# Patient Record
Sex: MALE | Race: WHITE | Employment: OTHER | URBAN - METROPOLITAN AREA
[De-identification: names, ages, dates, MRNs, and addresses within clinical notes are randomized per-mention and may not be internally consistent; named-entity substitution may affect disease eponyms.]

---

## 2017-01-20 ENCOUNTER — OFFICE VISIT (OUTPATIENT)
Dept: VASCULAR SURGERY | Age: 77
End: 2017-01-20

## 2017-01-20 DIAGNOSIS — I65.23: ICD-10-CM

## 2017-01-20 DIAGNOSIS — I70.213 ATHEROSCLEROSIS OF NATIVE ARTERY OF BOTH LOWER EXTREMITIES WITH INTERMITTENT CLAUDICATION (HCC): ICD-10-CM

## 2017-01-20 DIAGNOSIS — I74.09 AORTOILIAC OCCLUSIVE DISEASE (HCC): ICD-10-CM

## 2017-01-20 DIAGNOSIS — I77.9 BILATERAL CAROTID ARTERY DISEASE (HCC): ICD-10-CM

## 2017-01-20 NOTE — PROCEDURES
[facility]   *** FINAL REPORT ***    Name: Aruna Blake  MRN: [id number]       Outpatient  : 20 Dec 1940  HIS Order #: [id number]  [id number] [facility] Visit #: [id number]  Date: 2017    TYPE OF TEST: Peripheral Arterial Testing    REASON FOR TEST  Peripheral vascular dz NOS    Right Leg  Segmentals: Normal                     mmHg  Brachial         148  High thigh  Low thigh  Calf             158  Posterior tibial 160  Dorsalis pedis   146  Peroneal  Metatarsal  Toe pressure      67  Doppler:    Normal  Ankle/Brachial: 1.08    Left Leg  Segmentals: Normal                     mmHg  Brachial         144  High thigh  Low thigh  Calf             137  Posterior tibial 141  Dorsalis pedis   149  Peroneal  Metatarsal  Toe pressure     107  Doppler:    Normal  Ankle/Brachial: 1.01  Post exercise results:  Speed: 1.5  mph  Grade: 12  %  Duration: 5.0 MINS     Brachial  Right Ankle  BANDAR    Left Ankle  BANDAR    1:   159         109     0.69       102     0.64  2:   155         133     0.86       121     0.78  3:  4:  5:    INTERPRETATION/FINDINGS  Physiologic testing was performed using continuous wave doppler and  segmental pressures. 1. No evidence of significant peripheral arterial disease at rest in  the right leg. 2. No evidence of significant peripheral arterial disease at rest in  the left leg. 3. The right ankle/brachial index is 1.08 and the left ankle/brachial  index is 1.01.  4. The DBI on the right is 0.45 and on the left is 0.72.  5. Completed  treadmill exercise showed biphasic signals on the left  and biphasic signals wiht bruit on the right suggesting arterial  inflow disease. Significant drop in the ankle brachial index  bilaterally with exercise, from 1.08 to 0.69 in the right leg and from   1.01 to 0.64 in the left leg, consistent with bilateral significant  arterial occlusive disease. No significant changes at rest as compared with previous study.     ADDITIONAL COMMENTS    I have personally reviewed the data relevant to the interpretation of  this  study. TECHNOLOGIST: Sharon Barron RVT, BS  Signed: 01/20/2017 10:00 AM    PHYSICIAN: DANIEL Frederick   Signed: 01/20/2017 01:46 PM

## 2017-01-20 NOTE — PROCEDURES
[facility]   *** FINAL REPORT ***    Name: Teresa Still  MRN: [id number]       Outpatient  : 20 Dec 1940  HIS Order #: [id number]  [id number] [facility] Visit #: [id number]  Date: 2017    TYPE OF TEST: Cerebrovascular Duplex    REASON FOR TEST  Carotid disease, carotid stent    Right Carotid:-             Proximal               Mid                 Distal  cm/s  Systolic  Diastolic  Systolic  Diastolic  Systolic  Diastolic  CCA:     87.6      18.0                            69.0      19.0  Bulb:    48.0       8.0  ECA:     65.0       9.0  ICA:     84.0      29.0       86.0      30.0       90.0      29.0  ICA/CCA:  1.0       1.6    ICA Stenosis: Normal    Right Vertebral:-  Finding: Antegrade  Sys:       54.0  Haydee:       18.0    Right Subclavian:    Left Carotid:-            Proximal                Mid                 Distal  cm/s  Systolic  Diastolic  Systolic  Diastolic  Systolic  Diastolic  CCA:     01.3      12.0                            23.0       8.0  Bulb:    36.0      10.0  ECA:     28.0       7.0  ICA:     59.0      17.0       71.0      22.0       78.0      25.0  ICA/CCA:  1.8       2.1    ICA Stenosis: Normal    Left Vertebral:-  Finding: Not visualized  Sys:  Haydee:    Left Subclavian:    INTERPRETATION/FINDINGS  Duplex images were obtained using 2-D gray scale, color flow and  spectral doppler analysis. 1. Patent right CEA without significant restenosis. 2. Patent left common carotid/ICA stent without evidence of  significant stenosis. 2. No significant stenosis in the external carotid arteries  bilaterally. 3. Antegrade flow in the right vertebral artery. Unable to detect  Doppler signals in the left vertebral artery. No significant changes when compared to previous study. ADDITIONAL COMMENTS    I have personally reviewed the data relevant to the interpretation of  this  study. TECHNOLOGIST: Sintia Barron RVT, BS  Signed: 2017 10:18 AM    PHYSICIAN: Charisse Salguero D.O.   Signed: 01/20/2017 01:46 PM

## 2017-01-20 NOTE — PROCEDURES
[facility]   *** FINAL REPORT ***    Name: Xochilt Duenas  MRN: [id number]       Outpatient  : 20 Dec 1940  HIS Order #: [id number]  [id number] [facility] Visit #: [id number]  Date: 2017    TYPE OF TEST: Aorto-Iliac Duplex    REASON FOR TEST  Peripheral Arterial Disease    B-Mode:-                 (cm)   1     2     3  Aortic diameter:         AP:                 2.4                           TV:                 2.3  Common iliac diameter:   Right:                           Left:    Duplex:-                           PSV  Stenosis                           ----- --------------------  Aorta: (1)                     Normal         (2)         (3)                66.0    Right common iliac:  Right external iliac:    Left common iliac:  Left external iliac:    INTERPRETATION/FINDINGS  Duplex images were obtained using 2-D gray scale, color flow and  spectral doppler analysis. Iliac stents:  1. Bilateral common iliac arteries patent without significant  stenosis. Bilateral common iliac artery stents not visible. 2. The right common iliac artery measures approximately 1.6 x 1.6 cm  Trv and the left common iliac artery measures approximately 1.3 x 1.3  cm Trv. Complex plaquing noted in the infra-renal aorta involving the   proximal segments of the bilateral common iliac arteries. 3. Bilateral external iliac arteries stents patent without significant   stenosis. 4. Triphasic signals noted throughout. 5. ABIs suggest perfusion within normal limits bilaterally at rest.  The BANDAR on the right is 1.08 and on the left is 1.01. Essentially no significant changes as compared with previous study.     ADDITIONAL COMMENTS  Infra Ao: 66 cm/sec  RCIA:  Prx 102 cm/sec    Mid 113 cm/sec    Dst 115 cm/sec    RIIA Prx  142 cm/sec      REIA Stent:  Prx 179 cm/sec       Mid 166 cm/sec    Dst 168 cm/sec      REIA Dst 197 cm/sec  LCIA:  Prx 131 cm/sec     Mid 122 cm/sec     Dst 120 cm/sec     LIIA  Prx 97 cm/sec      APOLONIA Stent:  Prx 145 cm/sec      Mid 127 cm/sec     Dst 129 cm/sec       APOLONIA Dst 142 cm/sec    I have personally reviewed the data relevant to the interpretation of  this  study. TECHNOLOGIST: Rose Barron RVT, BS  Signed: 01/20/2017 11:42 AM    PHYSICIAN: DANIEL Frederick   Signed: 01/20/2017 01:46 PM

## 2017-02-01 ENCOUNTER — OFFICE VISIT (OUTPATIENT)
Dept: VASCULAR SURGERY | Age: 77
End: 2017-02-01

## 2017-02-01 VITALS
BODY MASS INDEX: 22.49 KG/M2 | WEIGHT: 135 LBS | HEIGHT: 65 IN | RESPIRATION RATE: 20 BRPM | SYSTOLIC BLOOD PRESSURE: 142 MMHG | HEART RATE: 76 BPM | DIASTOLIC BLOOD PRESSURE: 78 MMHG

## 2017-02-01 DIAGNOSIS — I77.9 BILATERAL CAROTID ARTERY DISEASE (HCC): Primary | ICD-10-CM

## 2017-02-01 DIAGNOSIS — I74.09 AORTOILIAC OCCLUSIVE DISEASE (HCC): ICD-10-CM

## 2017-02-01 DIAGNOSIS — Z87.891 HISTORY OF TOBACCO USE: ICD-10-CM

## 2017-02-01 DIAGNOSIS — I70.213 ATHEROSCLEROSIS OF NATIVE ARTERY OF BOTH LOWER EXTREMITIES WITH INTERMITTENT CLAUDICATION (HCC): ICD-10-CM

## 2017-02-01 RX ORDER — LOSARTAN POTASSIUM 50 MG/1
100 TABLET ORAL DAILY
COMMUNITY
End: 2021-09-01

## 2017-02-01 NOTE — PROGRESS NOTES
Flaquito Handley    Chief Complaint   Patient presents with    Leg Pain       History and Physical    Flaquito Handley is a 67 y.o. male with history of PAD who presents today in follow up. He had angio with balloon angioplasty and stent of right external iliac artery on 5/4 of last year. He has history of carotid stenosis with repair of the bilateral internal carotids in the past. He also has history of kissing stents and had rescue stenting of left external iliac artery in December of last year. He states that his pain has resolved. He does not describe any claudication at this time. He remains very active and states that he is able to go about his daily activities without any pain. No rest pain. He states he can walk as far as he wants if he walks slowly. Denies fever or chills. He quit smoking many years ago. He denies any strokelike symptoms. He does report that he has severe bruising with taking the Plavix and would like to stop taking the medication. He does take a daily aspirin as well. Past Medical History   Diagnosis Date    Adverse effect of anesthesia      slow to wake    Carotid artery disease ([address]      s/p left CEA 2006 with recurrent stenosis treated with stenting May 2011    Cataract     COPD (chronic obstructive pulmonary disease) ([address]      emphysema    DJD (degenerative joint disease)     Esophageal cancer (HCC)      Polyps removed    GERD (gastroesophageal reflux disease)     History of tobacco use     HTN (hypertension)     Hyperlipidemia     Normal nuclear stress test 05/27/2011     No ischemia or prior infarction. EF 64%. Neg EKG on max EST.   Ex time 5:15.   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-2011     Past Surgical History   Procedure Laterality Date    Hx carotid endarterectomy 2006     left    Hx heent       polyps removed from vocal cords    Hx refractive surgery      Hx other surgical       balloon and stent placed in leg    Hx other surgical       carotid stent placed     Patient Active Problem List   Diagnosis Code    Hyperlipidemia E78.5    GERD (gastroesophageal reflux disease) K21.9    Carotid artery disease (HCC) I77.9    HTN (hypertension) I10    DJD (degenerative joint disease) M19.90    History of tobacco use Z87.891    COPD (chronic obstructive pulmonary disease) ([facility] J44.9    PAD (peripheral artery disease) (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) I74.09     Current Outpatient Prescriptions   Medication Sig Dispense Refill    losartan (COZAAR) 50 mg tablet Take  by mouth daily.  clopidogrel (PLAVIX) 75 mg tablet Take 1 Tab by mouth daily. Indications: PERIPHERAL ARTERIAL THROMBOEMBOLISM PREVENTION 30 Tab 2    atorvastatin (LIPITOR) 80 mg tablet Take 1 tablet by mouth daily. 30 tablet 0    esomeprazole (NEXIUM) 40 mg capsule Take 40 mg by mouth daily.  tiotropium (SPIRIVA WITH HANDIHALER) 18 mcg inhalation capsule Take 1 Cap by inhalation daily. No Known Allergies    Physical Exam:    Visit Vitals    /78 (BP 1 Location: Left arm, BP Patient Position: Sitting)    Pulse 76    Resp 20    Ht 5' 5\" (1.651 m)    Wt 135 lb (61.2 kg)    BMI 22.47 kg/m2      General: Well-appearing male in no acute distress   HEENT: EOMI, no scleral icterus is noted. bilateral neck incisions are well-healed  Pulmonary: No increased work or breathing is noted. Abdomen: nondistended. Extremities: Warm and well perfused bilaterally. Pt has no bilateral lower extremity edema.    Neuro: Cranial nerves II through XII are grossly intact   Integument: No ulcerations are identified visibly    INTERPRETATION/FINDINGS  Duplex images were obtained using 2-D gray scale, color flow and  spectral doppler analysis. 1. Patent right CEA without significant restenosis. 2. Patent left common carotid/ICA stent without evidence of  significant stenosis. 2. No significant stenosis in the external carotid arteries  bilaterally. 3. Antegrade flow in the right vertebral artery. Unable to detect  Doppler signals in the left vertebral artery. No significant changes when compared to previous study. INTERPRETATION/FINDINGS  Physiologic testing was performed using continuous wave doppler and  segmental pressures. 1. No evidence of significant peripheral arterial disease at rest in  the right leg. 2. No evidence of significant peripheral arterial disease at rest in  the left leg. 3. The right ankle/brachial index is 1.08 and the left ankle/brachial  index is 1.01.  4. The DBI on the right is 0.45 and on the left is 0.72.  5. Completed treadmill exercise showed biphasic signals on the left  and biphasic signals wiht bruit on the right suggesting arterial  inflow disease. Significant drop in the ankle brachial index  bilaterally with exercise, from 1.08 to 0.69 in the right leg and from  1.01 to 0.64 in the left leg, consistent with bilateral significant  arterial occlusive disease. No significant changes at rest as compared with previous study. INTERPRETATION/FINDINGS  Duplex images were obtained using 2-D gray scale, color flow and  spectral doppler analysis. Iliac stents:  1. Bilateral common iliac arteries patent without significant  stenosis. Bilateral common iliac artery stents not visible. 2. The right common iliac artery measures approximately 1.6 x 1.6 cm  Trv and the left common iliac artery measures approximately 1.3 x 1.3  cm Trv. Complex plaquing noted in the infra-renal aorta involving the  proximal segments of the bilateral common iliac arteries. 3. Bilateral external iliac arteries stents patent without significant  stenosis.   4. Triphasic signals noted throughout. 5. ABIs suggest perfusion within normal limits bilaterally at rest.  The BANDAR on the right is 1.08 and on the left is 1.01. Essentially no significant changes as compared with previous study. Impression and Plan:  Kait Barton is a 67 y.o. male with PAD who presents today in f/u after vascular studies. He is s/p right leg angiogram with stenting of his external iliac artery. His imaging was reviewed with him today in the office. He has no significant arterial insufficiency at rest however he does have a significant reduction in his perfusion with exercise. Discussed that as he has no lifestyle limiting claudication would not recommend intervention at this time. No rest pain. Bilateral iliac stents are patent. His carotid study was also reviewed today in the office and reveals left carotid stent is patent as well as his right carotid endarterectomy with no significant stenosis appreciated. Patient is completely asymptomatic. Discussed that would recommend continuing Plavix if he can tolerate the medication however patient states he would like to stop it at this time. Discussed that he is far enough out from his stenting that he would be safe to stop the Plavix at this time and if his symptoms of claudication return or worsen he is to call the office and may benefit from restarting medication. He will continue his daily aspirin. Discussed we will repeat his studies in 1 year and he will f/u afterwards. He will f/u sooner as needed. Plan was discussed. Patient expresses understanding and agrees. Follow-up Disposition:  Return in about 1 year (around 2/1/2018). Rolena  Brittany  [phone]        PLEASE NOTE:  This document has been produced using voice recognition software. Unrecognized errors in transcription may be present.

## 2017-02-01 NOTE — MR AVS SNAPSHOT
Visit Information Date & Time Provider Department Dept. Phone Encounter #  
 2/1/2017 10:00 AM [address] [place] [address] Vein/Vascular [address] [id number] Follow-up Instructions Return in about 1 year (around 2/1/2018). Your Appointments 2/1/2018  8:00 AM  
PROCEDURE with [facility] NONIMAGING  
[facility] ([facility] SCHEDULING) Appt Note: amira 1 yr wild [address] [address]  
[phone] [address] [phone]  
  
    
 2/1/2018  9:00 AM  
PROCEDURE with [facility] IMAGING 2  
[facility] ([facility]SE [facility] SCHEDULING) Appt Note: iliac stent 1 yr wild [address] [address]  
[phone] [address] [address]  
  
    
 2/1/2018 10:00 AM  
PROCEDURE with [facility] IMAGING 2  
[facility] (JOSE [facility] SCHEDULING) Appt Note: cv 1 yr wild [address] [address]  
[phone]  
  
    
 2/12/2018 10:30 AM  
Follow Up with [address] [place]  
[facility] ([place]) Appt Note: 1 year fup with studis at the [address] [place] [address]  
[phone]  
  
   
 [address] [place] [id number] Upcoming Health Maintenance Date Due DTaP/Tdap/Td series (1 - Tdap) 12/20/1960 ZOSTER VACCINE AGE 60> 12/20/1999 GLAUCOMA SCREENING Q2Y 12/20/2004 Pneumococcal 65+ High/Highest Risk (1 of 2 - PCV13) 12/20/2004 MEDICARE YEARLY EXAM 12/20/2004 INFLUENZA AGE 9 TO ADULT 8/1/2016 Allergies as of 2/1/2017  Review Complete On: 2/1/2017 By: Corona Sanders LPN No Known Allergies Current Immunizations  Never Reviewed No immunizations on file. Not reviewed this visit You Were Diagnosed With   
  
 Codes Comments Bilateral carotid artery disease ([facility]    -  Primary ICD-10-CM: I77.9 ICD-9-CM: 447.9 Aortoiliac occlusive disease (HCC)     ICD-10-CM: I74.09 
ICD-9-CM: 444.09 Atherosclerosis of native artery of both lower extremities with intermittent claudication ([facility]     ICD-10-CM: W56.009 ICD-9-CM: 440.21 Vitals BP Pulse Resp Height(growth percentile) Weight(growth percentile) BMI  
 142/78 (BP 1 Location: Left arm, BP Patient Position: Sitting) 76 20 5' 5\" (1.651 m) 135 lb (61.2 kg) 22.47 kg/m2 Smoking Status Former Smoker Vitals History BMI and BSA Data Body Mass Index Body Surface Area  
 22.47 kg/m 2 1.68 m 2 Your Updated Medication List  
  
   
This list is accurate as of: 2/1/17 10:28 AM.  Always use your most recent med list.  
  
  
  
  
 atorvastatin 80 mg tablet Commonly known as:  LIPITOR Take 1 tablet by mouth daily. clopidogrel 75 mg Tab Commonly known as:  PLAVIX Take 1 Tab by mouth daily. Indications: PERIPHERAL ARTERIAL THROMBOEMBOLISM PREVENTION  
  
 losartan 50 mg tablet Commonly known as:  COZAAR Take  by mouth daily. NexIUM 40 mg capsule Generic drug:  esomeprazole Take 40 mg by mouth daily. SPIRIVA WITH HANDIHALER 18 mcg inhalation capsule Generic drug:  tiotropium Take 1 Cap by inhalation daily. Follow-up Instructions Return in about 1 year (around 2/1/2018). To-Do List   
 03/06/2017 9:00 AM  
  Appointment with [address] at [address] ([phone]) DIET RESTRICTIONS NPO, have nothing to eat or drink 4 hours prior to your exam.  EXAM INSTRUCTIONS - If your test requires you to drink oral contrast it may be picked up from radiology between 8am-5pm.  M-F Bring your written order for your exam when picking up your prep unless it has already been faxed by your physician. If you cannot make it between these times call the CT dept. [phone]. - If you are allergic to IV/Contrast dye/IVP dye/iodine, an  allergy prep is required.   If your doctor did not give you a prescription for Benadryl or Prednisone, you may  a prescription from radiology between 8am-5pm.  Bring your written order for your exam when picking up your prep unless it has already been faxed by your physician. - Only patients will be allowed in the exam room. This includes children. - Children under the age of 15 may not be left unattended - Please bring a list of all medications that you are currently taking, include prescription and over the counter. GENERAL INSTRUCTIONS - If you have a hand-written prescription for this exam, you must bring it with you on the day of your exam. - Bring all relevant prior images from facilities outside of [facility]. Failure to provide images may   delay reading by Radiologist. - Only patients will be allowed in the exam room. This includes children. - Children under the age of 15 may not be left unattended. - [address] [place] [address] patients must have an armband and be accompanied by a caregiver or family member. - Candice Bowman may be responsible for any co-payments and deductibles as indicated by your insurance carrier. APPOINTMENT CANCELLATION - To reschedule or cancel an appointment, please contact the Scheduling Department at [phone]  
  
 03/06/2017 9:30 AM  
  Appointment with [address] at [address] ([phone]) DIET RESTRICTIONS NPO, have nothing to eat or drink 4 hours prior to your exam.  EXAM INSTRUCTIONS - Please bring a list of all medications that you are currently taking, Include prescription and over the counter. - If you are allergic to IV/Contrast dye/IVP dye/iodine, an allergy prep is required. If your doctor did not give you a prescription for Benadryl or Prednisone, you may  a prescription from radiology between 8am-5pm.  Bring your written order for your exam when picking up your prep unless it has already been faxed by your physician.   GENERAL INSTRUCTIONS - If you have a hand-written prescription for this exam, you must bring it with you on the day of your exam. - Bring all relevant prior images from facilities outside of [facility]. Failure to provide images may delay reading by Radiologist. - Only patients will be allowed in the exam room. This includes children. - Children under the age of 15 may not be left unattended. - [facility] patients must have an armband and be accompanied by a caregiver or family member. - Sandra Paula may be responsible for any co-payments and deductibles as indicated by your insurance carrier. APPOINTMENT CANCELLATION - To reschedule or cancel an appointment, please contact the Scheduling Department at [phone]  
  
 02/01/2018 Imaging:  DUPLEX AORTA ILIAC GRAFT COMPLETE AMB   
  
 02/01/2018 Imaging:  DUPLEX CAROTID BILATERAL AMB   
  
 02/01/2018 Imaging:  LOWER EXT ART PVR W EXERC BILAT (TREADMILL/WALKING) AMB Introducing [facility]! Silvestre Wing introduces [employer] patient portal. Now you can access parts of your medical record, email your doctor's office, and request medication refills online. 1. In your internet browser, go to [email or web address] Management Groupt 2. Click on the First Time User? Click Here link in the Sign In box. You will see the New Member Sign Up page. 3. Enter your [employer] Access Code exactly as it appears below. You will not need to use this code after youve completed the sign-up process. If you do not sign up before the expiration date, you must request a new code. · [employer] Access Code: [email or web address] Expires: 4/20/2017  8:03 AM 
 
4. Enter the last four digits of your Social Security Number (xxxx) and Date of Birth (mm/dd/yyyy) as indicated and click Submit. You will be taken to the next sign-up page. 5. Create a [employer] ID. This will be your [employer] login ID and cannot be changed, so think of one that is secure and easy to remember. 6. Create a [employer] password. You can change your password at any time. 7. Enter your Password Reset Question and Answer. This can be used at a later time if you forget your password. 8. Enter your e-mail address. You will receive e-mail notification when new information is available in [address] 9. Click Sign Up. You can now view and download portions of your medical record. 10. Click the Download Summary menu link to download a portable copy of your medical information. If you have questions, please visit the Frequently Asked Questions section of the [employer] website. Remember, [employer] is NOT to be used for urgent needs. For medical emergencies, dial 911. Now available from your iPhone and Android! Please provide this summary of care documentation to your next provider. Your primary care clinician is listed as Charly Raymundo. If you have any questions after today's visit, please call [phone].

## 2018-01-03 ENCOUNTER — HOSPITAL ENCOUNTER (OUTPATIENT)
Dept: ULTRASOUND IMAGING | Age: 78
Discharge: HOME | End: 2018-01-03
Attending: FAMILY MEDICINE
Payer: MEDICARE

## 2018-01-03 DIAGNOSIS — I12.9 MALIGNANT HYPERTENSIVE KIDNEY DISEASE WITH CHRONIC KIDNEY DISEASE STAGE I THROUGH STAGE IV, OR UNSPECIFIED(403.00): ICD-10-CM

## 2018-01-03 PROCEDURE — 76770 US EXAM ABDO BACK WALL COMP: CPT

## 2018-03-09 ENCOUNTER — OFFICE VISIT (OUTPATIENT)
Dept: VASCULAR SURGERY | Age: 78
End: 2018-03-09

## 2018-03-09 DIAGNOSIS — I73.9 PVD (PERIPHERAL VASCULAR DISEASE) (HCC): Primary | ICD-10-CM

## 2018-03-09 DIAGNOSIS — I74.09 AORTOILIAC OCCLUSIVE DISEASE (HCC): ICD-10-CM

## 2018-03-09 DIAGNOSIS — I70.213 ATHEROSCLEROSIS OF NATIVE ARTERY OF BOTH LOWER EXTREMITIES WITH INTERMITTENT CLAUDICATION (HCC): ICD-10-CM

## 2018-03-09 DIAGNOSIS — I77.9 BILATERAL CAROTID ARTERY DISEASE (HCC): ICD-10-CM

## 2018-03-09 DIAGNOSIS — Z95.828 S/P INSERTION OF ILIAC ARTERY STENT: ICD-10-CM

## 2018-03-09 NOTE — PROCEDURES
[facility]  *** FINAL REPORT ***    Name: Mikala Porter  MRN: [id number]       Outpatient  : 20 Dec 1940  HIS Order #: [id number]  Gianfranco Visit #: [id number]  Date: 09 Mar 2018    TYPE OF TEST: Cerebrovascular Duplex    REASON FOR TEST  Carotid disease    Right Carotid:-             Proximal               Mid                 Distal  cm/s  Systolic  Diastolic  Systolic  Diastolic  Systolic  Diastolic  CCA:     92.4      14.0                            58.0      15.0  Bulb:    55.0      14.0  ECA:     44.0       8.0  ICA:     77.0      25.0       69.0      21.0       78.0      30.0  ICA/CCA:  1.1       2.1    ICA Stenosis: Normal    Right Vertebral:-  Finding: Antegrade  Sys:       54.0  Haydee:       14.0    Right Subclavian:    Left Carotid:-            Proximal                Mid                 Distal  cm/s  Systolic  Diastolic  Systolic  Diastolic  Systolic  Diastolic  CCA:     60.4       9.0                            30.0       8.0  Bulb:    40.0      12.0  ECA:     27.0       5.0  ICA:     57.0      19.0       77.0      21.0       75.0      25.0  ICA/CCA:  2.1       2.3    ICA Stenosis: Normal    Left Vertebral:-  Finding: Occluded  Sys:        0.0  Haydee:        0.0    Left Subclavian:    INTERPRETATION/FINDINGS  Duplex images were obtained using 2-D gray scale, color flow and  spectral doppler analysis. 1. Patent right carotid artery endarterectomy without significant  restenosis. 2. Patent left common carotid/internal carotid artery stent without  significant stenosis. 2. No significant stenosis in the external carotid arteries  bilaterally. 3. Antegrade flow in the right vertebral artery. 4. Unable to detect Doppler signals in the left vertebral artery. No significant changes when compared to previous study. ADDITIONAL COMMENTS    I have personally reviewed the data relevant to the interpretation of  this  study. TECHNOLOGIST: Janeth Barron RVT, BS  Signed: 2018 10:39 AM    PHYSICIAN: Jason Davey.  Mckinley Alexandra MD  Signed: 03/12/2018 08:50 AM

## 2018-03-09 NOTE — PROCEDURES
[facility]  *** FINAL REPORT ***    Name: Elvin Angelucci  MRN: [id number]       Outpatient  : 20 Dec 1940  HIS Order #: [id number]  [address] [facility] Visit #: [id number]  Date: 09 Mar 2018    TYPE OF TEST: Aorto-Iliac Duplex    REASON FOR TEST  Peripheral Arterial Disease    B-Mode:-                 (cm)   1     2     3  Aortic diameter:         AP:                           TV:  Common iliac diameter:   Right:                           Left:    Duplex:-                           PSV  Stenosis                           ----- --------------------  Aorta: (1)                     Normal         (2)         (3)                61.0    Right common iliac:      108.0  Right external iliac:    210.0    Left common iliac:       117.0  Left external iliac:     181.0    INTERPRETATION/FINDINGS  Duplex images were obtained using 2-D gray scale, color flow and  spectral doppler analysis. Iliac stents:  1. Bilateral common iliac arteries patent without significant  stenosis. Bilateral common iliac artery stents not clearly visible. 2. The right common artery measures 1.4 x 1.4 cm Trv. The left common   artery measures 1.1 x 1.2 cm Trv.  3. Bilateral external iliac artery stents patent without significant  stenosis. Multphasic signals noted throughout. 4. ABIs suggest normal perfusion bilaterally at rest.  The BANDAR on the  right is 1.04 and on the left is 1.05.    ADDITIONAL COMMENTS  Term Ao: 61 cm/sec  RCIA (1.4 x 1.4 cm Trv)      Prx 108 cm/sec   Mid 82 cm/sec   Dst 102 cm/sec           REIA Stent:  Prx 163 cm/sec    Mid 149 cm/sec   Dst 121  cm/sec    Outflow 210 cm/sec  LCIA (1.1 x 1.2 cm Trv)      Prx 106 cm/sec     Mid 115 cm/sec    Dst 117 cm/sec          APOLONIA Stent:  Prx 121 cm/sec    Mid 117 cm/sec    Dst 181 cm/sc      Outflow 127 cm/sec    I have personally reviewed the data relevant to the interpretation of  this  study. TECHNOLOGIST: Daniela Barron RVT, BS  Signed: 2018 10:53 AM    PHYSICIAN: Jose Luis Murdock MD  Signed: 03/12/2018 08:50 AM

## 2018-03-09 NOTE — PROCEDURES
[facility]  *** FINAL REPORT ***    Name: Kailyn Whitfield  MRN: [id number]       Outpatient  : 20 Dec 1940  HIS Order #: [id number]  [address] [facility] Visit #: [id number]  Date: 09 Mar 2018    TYPE OF TEST: Peripheral Arterial Testing    REASON FOR TEST  Peripheral vascular dz NOS    Right Leg  Segmentals: Normal                     mmHg  Brachial         152  High thigh  Low thigh  Calf  Posterior tibial 158  Dorsalis pedis   144  Peroneal  Metatarsal  Toe pressure  Doppler:    Normal  Ankle/Brachial: 1.04    Left Leg  Segmentals: Normal                     mmHg  Brachial         146  High thigh  Low thigh  Calf  Posterior tibial 141  Dorsalis pedis   159  Peroneal  Metatarsal  Toe pressure  Doppler:    Normal  Ankle/Brachial: 1.05    INTERPRETATION/FINDINGS  Physiologic testing was performed using continuous wave doppler and  segmental pressures. ABIs only:  1. No evidence of significant peripheral arterial disease at rest in  the right leg. 2. No evidence of significant peripheral arterial disease at rest in  the left leg. 3. The right ankle/brachial index is 1.04 and the left ankle/brachial  index is 1.05. No significant changes when compared with the previous study. ADDITIONAL COMMENTS    I have personally reviewed the data relevant to the interpretation of  this  study. TECHNOLOGIST: Jerri Barron RVT, BS  Signed: 2018 10:43 AM    PHYSICIAN: Sealed Air Nam Harry MD  Signed: 2018 08:48 AM

## 2018-03-14 ENCOUNTER — OFFICE VISIT (OUTPATIENT)
Dept: VASCULAR SURGERY | Age: 78
End: 2018-03-14

## 2018-03-14 VITALS
RESPIRATION RATE: 17 BRPM | SYSTOLIC BLOOD PRESSURE: 130 MMHG | BODY MASS INDEX: 22.49 KG/M2 | WEIGHT: 135 LBS | HEIGHT: 65 IN | DIASTOLIC BLOOD PRESSURE: 80 MMHG | HEART RATE: 80 BPM

## 2018-03-14 DIAGNOSIS — I77.9 BILATERAL CAROTID ARTERY DISEASE (HCC): Primary | ICD-10-CM

## 2018-03-14 DIAGNOSIS — Z87.891 HISTORY OF TOBACCO USE: ICD-10-CM

## 2018-03-14 DIAGNOSIS — I70.213 ATHEROSCLEROSIS OF NATIVE ARTERY OF BOTH LOWER EXTREMITIES WITH INTERMITTENT CLAUDICATION (HCC): ICD-10-CM

## 2018-03-14 DIAGNOSIS — I74.09 AORTOILIAC OCCLUSIVE DISEASE (HCC): ICD-10-CM

## 2018-03-14 DIAGNOSIS — I73.9 PAD (PERIPHERAL ARTERY DISEASE) (HCC): ICD-10-CM

## 2018-03-14 NOTE — PROGRESS NOTES
1. Have you been to an emergency room or urgent care clinic since your last visit? NO    Hospitalized since your last visit? NO    2. Have you seen or consulted any other health care providers outside of the [facility] since your last visit including any procedures, health maintenance items.   [address] [facility]

## 2018-03-14 NOTE — MR AVS SNAPSHOT
[address] [facility] [address] 
[phone] Patient: Misa Carrillo MRN: [id number] :1940 Visit Information Date & Time Provider Department Dept. Phone Encounter #  
 3/14/2018 11:30 AM [facility] [phone] Follow-up Instructions Return in about 1 year (around 3/14/2019). Your Appointments 3/15/2019  9:00 AM  
PROCEDURE with [facility] IMAGING 1 [facility] ([facility] Appt Note: iliac stents wild 1 year  [facility] November 745 200 [facility]  
[phone] [address] [place] [address],[place] [address]  
  
    
 3/15/2019 10:00 AM  
PROCEDURE with [facility] IMAGING 1 [facility] ([facility]) Appt Note: CV WILD [address] [place] [address] 339 [address] [facility]  
[phone] 3/15/2019 11:00 AM  
PROCEDURE with [facility] NONIMAGING [facility] ([facility]) Appt Note: amira wild 1 year  [facility] November 075 200 [facility]  
[phone]  [facility] November 47 [facility]  
  
    
 3/27/2019 11:30 AM  
Follow Up with Kerri Ignacio [facility] ([facility]) Appt Note: 1 year follow after studies with prep  [facility] November 229 [address] [facility]  
[phone] 12[address] [facility] 200 [facility] Upcoming Health Maintenance Date Due DTaP/Tdap/Td series (1 - Tdap) 1960 ZOSTER VACCINE AGE 60> 10/20/1999 GLAUCOMA SCREENING Q2Y 2004 Pneumococcal 65+ High/Highest Risk (1 of 2 - PCV13) 2004 MEDICARE YEARLY EXAM 2004 Influenza Age 5 to Adult 2017 Allergies as of 3/14/2018  Review Complete On: 3/14/2018 By: Maame Capone Vahidleoncio Doyle No Known Allergies Current Immunizations  Never Reviewed No immunizations on file. Not reviewed this visit You Were Diagnosed With   
  
 Codes Comments Bilateral carotid artery disease ([facility]    -  Primary ICD-10-CM: I77.9 ICD-9-CM: 447.9 PAD (peripheral artery disease) (HCC)     ICD-10-CM: I73.9 ICD-9-CM: 443. 9 Vitals BP Pulse Resp Height(growth percentile) Weight(growth percentile) BMI  
 130/80 (BP 1 Location: Left arm, BP Patient Position: Sitting) 80 17 5' 5\" (1.651 m) 135 lb (61.2 kg) 22.47 kg/m2 Smoking Status Former Smoker Vitals History BMI and BSA Data Body Mass Index Body Surface Area  
 22.47 kg/m 2 1.68 m 2 Your Updated Medication List  
  
   
This list is accurate as of 3/14/18 11:46 AM.  Always use your most recent med list.  
  
  
  
  
 atorvastatin 80 mg tablet Commonly known as:  LIPITOR Take 1 tablet by mouth daily. clopidogrel 75 mg Tab Commonly known as:  PLAVIX Take 1 Tab by mouth daily. Indications: PERIPHERAL ARTERIAL THROMBOEMBOLISM PREVENTION  
  
 losartan 50 mg tablet Commonly known as:  COZAAR Take  by mouth daily. NexIUM 40 mg capsule Generic drug:  esomeprazole Take 40 mg by mouth daily. SPIRIVA WITH HANDIHALER 18 mcg inhalation capsule Generic drug:  tiotropium Take 1 Cap by inhalation daily. Follow-up Instructions Return in about 1 year (around 3/14/2019). To-Do List   
 03/14/2019 Imaging:  BANDAR WBI LTD SINGLE LEVEL AMB   
  
 03/14/2019 Imaging:  DUPLEX AORTA ILIAC GRAFT LTD LT AMB   
  
 03/14/2019 Imaging:  DUPLEX CAROTID BILATERAL AMB Introducing [facility]! Ari Dominguez introduces [employer] patient portal. Now you can access parts of your medical record, email your doctor's office, and request medication refills online. 1. In your internet browser, go to [email or web address] [email or web address]/[email or web address] 2. Click on the First Time User? Click Here link in the Sign In box. You will see the New Member Sign Up page. 3. Enter your [employer] Access Code exactly as it appears below. You will not need to use this code after youve completed the sign-up process. If you do not sign up before the expiration date, you must request a new code. · [employer] Access Code: [email or web address] Expires: 3/21/2018 12:32 PM 
 
4. Enter the last four digits of your Social Security Number (xxxx) and Date of Birth (mm/dd/yyyy) as indicated and click Submit. You will be taken to the next sign-up page. 5. Create a [employer] ID. This will be your [employer] login ID and cannot be changed, so think of one that is secure and easy to remember. 6. Create a [employer] password. You can change your password at any time. 7. Enter your Password Reset Question and Answer. This can be used at a later time if you forget your password. 8. Enter your e-mail address. You will receive e-mail notification when new information is available in [address] 9. Click Sign Up. You can now view and download portions of your medical record. 10. Click the Download Summary menu link to download a portable copy of your medical information. If you have questions, please visit the Frequently Asked Questions section of the [employer] website. Remember, [employer] is NOT to be used for urgent needs. For medical emergencies, dial 911. Now available from your iPhone and Android! Please provide this summary of care documentation to your next provider. Your primary care clinician is listed as Yessica Edouard. If you have any questions after today's visit, please call [phone].

## 2018-03-14 NOTE — PROGRESS NOTES
Bishop Alfonso    Chief Complaint   Patient presents with    Leg Pain     Follow up studies       History and Physical    Bishop Alfonso is a 65 y.o. male with history of PAD and carotid stenosis who presents today for his one year follow up appointment. He has history of stenting of the right external iliac artery as well as history of kissing stents and had rescue stenting of left external iliac artery. He also has history of carotid stenosis with history of right CEA and left carotid stent. He states that he is doing very well overall. He does report that he is recovering from a recent bout of shingles. He states that this affected his left hip area. He does still have some pain and numbness in the left buttock and hip area but feels this is slowly improving. He does state that the pain is reminiscent of his initial claudication symptoms however this pain is improving rather than worsening. He denies any fevers or chills. He does not describe any true claudication symptoms at this time. He denies any strokelike symptoms. No rest pain or skin changes. He remains quite active and is able to go about his daily activities without any pain. He did have follow-up studies and presents today for his results. His carotid duplex shows patent right carotid artery endarterectomy without significant restenosis. Patent left common carotid/internal carotid artery stent without significant stenosis. No significant stenosis in the external carotid arteries bilaterally. Antegrade flow in the right vertebral artery. Unable to detect Doppler signals in the left vertebral artery. Iliac study shows bilateral common iliac arteries patent without significant stenosis. Bilateral common iliac artery stents not clearly visible. The right common artery measures 1.4 x 1.4 cm Trv. The left common artery measures 1.1 x 1.2 cm Trv. Bilateral external iliac artery stents patent without significant stenosis.   Multphasic signals noted throughout. ABIs suggest normal perfusion bilaterally at rest.  The BANDAR on the right is 1.04 and on the left is 1.05. Past Medical History:   Diagnosis Date    Adverse effect of anesthesia     slow to wake    Carotid artery disease ([facility] [address]     s/p left CEA 2006 with recurrent stenosis treated with stenting May 2011    Cataract     COPD (chronic obstructive pulmonary disease) ([facility] [address]     emphysema    DJD (degenerative joint disease)     Esophageal cancer (HCC)     Polyps removed    GERD (gastroesophageal reflux disease)     History of tobacco use     HTN (hypertension)     Hyperlipidemia     Normal nuclear stress test 05/27/2011    No ischemia or prior infarction. EF 64%. Neg EKG on max EST. Ex time 5:15.   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     Past Surgical History:   Procedure Laterality Date    HX CAROTID ENDARTERECTOMY  2006    left    HX HEENT      polyps removed from vocal cords    HX OTHER SURGICAL      balloon and stent placed in leg    HX OTHER SURGICAL      carotid stent placed    HX REFRACTIVE SURGERY       Patient Active Problem List   Diagnosis Code    Hyperlipidemia E78.5    GERD (gastroesophageal reflux disease) K21.9    Carotid artery disease (HCC) I77.9    HTN (hypertension) I10    DJD (degenerative joint disease) M19.90    History of tobacco use Z87.891    COPD (chronic obstructive pulmonary disease) ([facility] [address] [facility] J44.9    PAD (peripheral artery disease) ([facility]) I73.9    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       clopidogrel (PLAVIX) 75 mg tablet Take 1 Tab by mouth daily. Indications: PERIPHERAL ARTERIAL THROMBOEMBOLISM PREVENTION 30 Tab 2    atorvastatin (LIPITOR) 80 mg tablet Take 1 tablet by mouth daily. 30 tablet 0    esomeprazole (NEXIUM) 40 mg capsule Take 40 mg by mouth daily.  tiotropium (SPIRIVA WITH HANDIHALER) 18 mcg inhalation capsule Take 1 Cap by inhalation daily. No Known Allergies    Physical Exam:    Visit Vitals    /80 (BP 1 Location: Left arm, BP Patient Position: Sitting)    Pulse 80    Resp 17    Ht 5' 5\" (1.651 m)    Wt 135 lb (61.2 kg)    BMI 22.47 kg/m2      General: Well-appearing male in no acute distress   HEENT: EOMI, no scleral icterus is noted. No carotid bruits appreciated bilaterally  CV: RRR  Pulmonary: No increased work or breathing is noted. CTA bilaterally  Abdomen: nondistended. Extremities: Warm and well perfused bilaterally. Pt has no bilateral lower extremity edema. Neuro: Cranial nerves II through XII are grossly intact   Integument: No ulcerations are identified visibly      Impression and Plan:  Chelita Simon is a 65 y.o. male with PAD who presents today for his one year f/u after vascular studies. His imaging was reviewed with him today in the office. He has no significant arterial insufficiency at rest and his stents are patent bilaterally. He did have a significant reduction in his perfusion with exercise upon review of studies done in 2017. He does have some left hip and buttock pain which started after a recent shingles infection and is slowly improving. Discussed that as he has no lifestyle limiting claudication would not recommend intervention at this time or further imaging however discussed that if his left hip/buttock pain does not resolve or gets worse he should call the office right away for further investigation. He denies any rest pain.  His carotid study was also reviewed today in the office and reveals left carotid stent is patent as well as his right carotid endarterectomy with no significant stenosis appreciated. Patient is completely asymptomatic. He will continue his daily aspirin. Discussed we will repeat his studies in 1 year and he will f/u afterwards. He will f/u sooner as needed. Plan was discussed. Patient expresses understanding and agrees. Follow-up Disposition:  Return in about 1 year (around 3/14/2019). Hanny Soto  [phone]        PLEASE NOTE:  This document has been produced using voice recognition software. Unrecognized errors in transcription may be present.

## 2019-03-27 ENCOUNTER — OFFICE VISIT (OUTPATIENT)
Dept: VASCULAR SURGERY | Age: 79
End: 2019-03-27

## 2019-03-27 VITALS
HEART RATE: 80 BPM | BODY MASS INDEX: 22.49 KG/M2 | WEIGHT: 135 LBS | RESPIRATION RATE: 16 BRPM | SYSTOLIC BLOOD PRESSURE: 110 MMHG | DIASTOLIC BLOOD PRESSURE: 60 MMHG | HEIGHT: 65 IN

## 2019-03-27 DIAGNOSIS — I73.9 PAD (PERIPHERAL ARTERY DISEASE) (HCC): ICD-10-CM

## 2019-03-27 DIAGNOSIS — I65.23 BILATERAL CAROTID ARTERY STENOSIS: Primary | ICD-10-CM

## 2019-03-27 DIAGNOSIS — I74.09 AORTOILIAC OCCLUSIVE DISEASE (HCC): ICD-10-CM

## 2019-03-27 NOTE — PROGRESS NOTES
Hamilton Berry    Chief Complaint   Patient presents with    Leg Pain       History and Physical    Hamilton Berry is a 77 y.o. male with history of PAD and carotid stenosis who presents today for his one year follow up appointment. He has history of stenting of the right external iliac artery as well as history of kissing stents and had rescue stenting of left external iliac artery. He also has history of carotid stenosis with history of right CEA and left carotid stent. He states that he is doing well overall. He has been battling bronchitis for the past month but feels he is slowly improving. Otherwise he is without complaint. He denies any fevers or chills. He has no claudication symptoms. No rest pain. No skin changes or ulcers. He denies any strokelike symptoms. He remains active and is able to go about his daily activities without any pain. He did have follow-up studies and presents today for his results. His carotid duplex shows \"Patent right carotid endarterectomy. Diffuse calcific plaque with no significant stenosis. Right vertebral is antegrade. Patent left common carotid to internal carotid artery stent without evidence of significant stenosis. Left vertebral not well visualized\". Arterial study shows \"Patent right iliac artery stent without evidence of stenosis, multiphasic waveforms throughout. Patent left iliac artery stent without significant stenosis, multiphasic waveforms noted throughout. Normal ankle-brachial indexes bilateral\".       Past Medical History:   Diagnosis Date    Adverse effect of anesthesia     slow to wake    Carotid artery disease ([address]     s/p left CEA 2006 with recurrent stenosis treated with stenting May 2011    Cataract     COPD (chronic obstructive pulmonary disease) ([address]     emphysema    DJD (degenerative joint disease)     Esophageal cancer (HCC)     Polyps removed    GERD (gastroesophageal reflux disease)     History of tobacco use     HTN (hypertension)  Hyperlipidemia     Normal nuclear stress test 05/27/2011    No ischemia or prior infarction. EF 64%. Neg EKG on max EST. Ex time 5:15.   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     Past Surgical History:   Procedure Laterality Date    HX CAROTID ENDARTERECTOMY  2006    left    HX HEENT      polyps removed from vocal cords    HX OTHER SURGICAL      balloon and stent placed in leg    HX OTHER SURGICAL      carotid stent placed    HX REFRACTIVE SURGERY       Patient Active Problem List   Diagnosis Code    Hyperlipidemia E78.5    GERD (gastroesophageal reflux disease) K21.9    Carotid artery disease (HCC) I77.9    HTN (hypertension) I10    DJD (degenerative joint disease) M19.90    History of tobacco use Z87.891    COPD (chronic obstructive pulmonary disease) ([facility] J44.9    PAD (peripheral artery disease) ([facility]) I73.9    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  clopidogrel (PLAVIX) 75 mg tablet Take 1 Tab by mouth daily. Indications: PERIPHERAL ARTERIAL THROMBOEMBOLISM PREVENTION 30 Tab 2    atorvastatin (LIPITOR) 80 mg tablet Take 1 tablet by mouth daily. 30 tablet 0    esomeprazole (NEXIUM) 40 mg capsule Take 40 mg by mouth daily.  tiotropium (SPIRIVA WITH HANDIHALER) 18 mcg inhalation capsule Take 1 Cap by inhalation daily.        No Known Allergies    Physical Exam:    Visit Vitals  /60 (BP 1 Location: Left arm, BP Patient Position: Sitting)   Pulse 80   Resp 16   Ht 5' 5\" (1.651 m)   Wt 135 lb (61.2 kg)   BMI 22.47 kg/m²      General: Well-appearing male in no acute distress   HEENT: EOMI, no scleral icterus is noted. No carotid bruits appreciated bilaterally  CV: RRR  Pulmonary: No increased work or breathing is noted. Abdomen: thin, nondistended. Extremities: Warm and well perfused bilaterally. Pt has no bilateral lower extremity edema. Neuro: Cranial nerves II through XII are grossly intact   Integument: No ulcerations are identified visibly      Impression and Plan:  Vito Wallace is a 77 y.o. male with PAD who presents today for his one year f/u after vascular studies. His imaging was reviewed with him today in the office. He has no significant arterial insufficiency at rest and his stents are patent bilaterally. He denies any claudication symptoms. He denies any rest pain. His carotid study was also reviewed today in the office and reveals left carotid stent is patent as well as his right carotid endarterectomy with no significant stenosis appreciated. Patient is completely asymptomatic. He will continue his daily aspirin. Discussed we will repeat his studies in 1 year and he will f/u afterwards. He will f/u sooner as needed. Plan was discussed. Patient expresses understanding and agrees. Follow-up and Dispositions    · Return in about 1 year (around 3/27/2020). [facility]  [phone]        PLEASE NOTE:  This document has been produced using voice recognition software. Unrecognized errors in transcription may be present.

## 2019-03-27 NOTE — PROGRESS NOTES
1. Have you been to an emergency room or urgent care clinic since your last visit? No    Hospitalized since your last visit? If yes, where, when, and reason for visit? NO  2. Have you seen or consulted any other health care providers outside of the [facility] since your last visit including any procedures, health maintenance items. If yes, where, when and reason for visit?  NO

## 2019-03-27 NOTE — LETTER
3/27/19 Patient: Raul Adame YOB: 1940 Date of Visit: 3/27/2019 Todd Pickens MD 
[address] [place] [address] [address] [facility] [address] VIA Facsimile: [phone] Dear Todd Pickens MD, Thank you for referring Mr. Donis Carrera to [address] [place] [address] [facility] for evaluation. My notes for this consultation are attached. If you have questions, please do not hesitate to call me. I look forward to following your patient along with you. Sincerely, [address] [place] [address]

## 2020-03-23 ENCOUNTER — TELEPHONE (OUTPATIENT)
Dept: VASCULAR SURGERY | Age: 80
End: 2020-03-23

## 2020-03-23 NOTE — TELEPHONE ENCOUNTER
Contacted patient in regards to upcoming appointment for test results. Due to COVID 19, we will have the provider call patient with the results instead of having patient come into the office. Patient is in agreement with plan.

## 2020-04-01 ENCOUNTER — TELEPHONE (OUTPATIENT)
Dept: VASCULAR SURGERY | Age: 80
End: 2020-04-01

## 2020-04-01 DIAGNOSIS — I65.23 BILATERAL CAROTID ARTERY STENOSIS: ICD-10-CM

## 2020-04-01 DIAGNOSIS — I74.09 AORTOILIAC OCCLUSIVE DISEASE (HCC): Primary | ICD-10-CM

## 2020-04-01 DIAGNOSIS — I73.9 PAD (PERIPHERAL ARTERY DISEASE) (HCC): ICD-10-CM

## 2020-04-01 RX ORDER — FLUTICASONE FUROATE, UMECLIDINIUM BROMIDE AND VILANTEROL TRIFENATATE 100; 62.5; 25 UG/1; UG/1; UG/1
1 POWDER RESPIRATORY (INHALATION) DAILY
Qty: 1 INHALER | Refills: 0
Start: 2020-04-01

## 2020-04-01 NOTE — TELEPHONE ENCOUNTER
Called patient to discuss results of arterial studies. Office appt cancelled due to [address]. Study results as follows reviewed with patient over the phone-    Bilateral iliac arteries and stents are patent and multiphasic. No evidence of inflow disease notable. Normal bilateral ankle-brachial index. No evidence of arterial insufficiency bilateral lower extremity. Tibial vessels are all multiphasic. Toe waveforms are dampened indicating possible small vessel disease  Patent right carotid endarterectomy, mild heterogeneous plaque seen with less than 50% stenosis. Patent left carotid stent with no evidence of stenosis. Vertebrals are antegrade bilateral.  Subclavian's are biphasic and normal-appearing bilateral.    Patient doing well. He was recently diagnosed with a UTI which is being treated by his PCP. Otherwise he is without complaint. No claudication reported. No rest pain. No stroke like symptoms or amaurosis fugax. Discussed can repeat studies in one year and have him follow up then. He is certainly understanding to call the office sooner as needed.

## 2021-04-06 DIAGNOSIS — I73.9 PAD (PERIPHERAL ARTERY DISEASE) (HCC): ICD-10-CM

## 2021-04-06 DIAGNOSIS — I65.23 BILATERAL CAROTID ARTERY STENOSIS: Primary | ICD-10-CM

## 2021-04-06 DIAGNOSIS — I65.23 BILATERAL CAROTID ARTERY STENOSIS: ICD-10-CM

## 2021-04-06 NOTE — PROGRESS NOTES
Order for amira, CV and aorta iliac duplex limited left placed per verbal order from Dr. Carl Kessler due to order being discontinued.

## 2021-04-07 LAB
ABDOMINAL DIST AORTA VEL: 63 CM/S
LEFT ABI: 0.9
LEFT ANTERIOR TIBIAL: 147 MMHG
LEFT ARM BP: 161 MMHG
LEFT ARM BP: 161 MMHG
LEFT CCA PROX DIAS: 15 CM/S
LEFT CCA PROX SYS: 64.1 CM/S
LEFT COM ILIAC DIST VEL: 175 CM/S
LEFT COM ILIAC MID VEL: 168 CM/S
LEFT COM ILIAC PROX VEL: 130 CM/S
LEFT DIST OUTFLOW EDV: 13 CM/S
LEFT DIST OUTFLOW PSV: 174 CM/S
LEFT DIST OUTFLOW PSV: 46 CM/S
LEFT GRAFT 1 NAME: NORMAL
LEFT GRAFT 1 NAME: NORMAL
LEFT ICA DIST DIAS: 16.1 CM/S
LEFT ICA DIST SYS: 64.4 CM/S
LEFT ICA/CCA SYS: 1
LEFT INFLOW ARTERY EDV: 9 CM/S
LEFT INFLOW ARTERY PSV: 175 CM/S
LEFT INFLOW ARTERY PSV: 26 CM/S
LEFT MID OUTFLOW EDV: 10 CM/S
LEFT MID OUTFLOW PSV: 190 CM/S
LEFT MID OUTFLOW PSV: 24 CM/S
LEFT OUTFLOW VESSEL EDV: 21 CM/S
LEFT OUTFLOW VESSEL PSV: 168 CM/S
LEFT OUTFLOW VESSEL PSV: 60 CM/S
LEFT POSTERIOR TIBIAL: 147 MMHG
LEFT PROX OUTFLOW EDV: 8 CM/S
LEFT PROX OUTFLOW PSV: 22 CM/S
LEFT PROX OUTFLOW PSV: 293 CM/S
LEFT TBI: 0.66
LEFT TOE PRESSURE: 109 MMHG
LEFT VERTEBRAL DIAS: 0 CM/S
LEFT VERTEBRAL SYS: 0 CM/S
Lab: 124 CM/S
Lab: 180 CM/S
Lab: 187 CM/S
Lab: 235 CM/S
Lab: 370 CM/S
Lab: NORMAL
RIGHT ABI: 1.01
RIGHT ANTERIOR TIBIAL: 164 MMHG
RIGHT ARM BP: 164 MMHG
RIGHT ARM BP: 164 MMHG
RIGHT BULB EDV: 7.7 CM/S
RIGHT BULB PSV: 36.1 CM/S
RIGHT CCA DIST DIAS: 15.8 CM/S
RIGHT CCA DIST SYS: 65 CM/S
RIGHT CCA MID DIAS: 18.3 CM/S
RIGHT CCA MID SYS: 83.4 CM/S
RIGHT CCA PROX DIAS: 18.9 CM/S
RIGHT CCA PROX SYS: 131.8 CM/S
RIGHT DIST OUTFLOW PSV: 124 CM/S
RIGHT ECA DIAS: 12.9 CM/S
RIGHT ECA SYS: 77.7 CM/S
RIGHT GRAFT 1 NAME: NORMAL
RIGHT ICA DIST DIAS: 19.8 CM/S
RIGHT ICA DIST SYS: 76.2 CM/S
RIGHT ICA MID DIAS: 19.8 CM/S
RIGHT ICA MID SYS: 85.2 CM/S
RIGHT ICA PROX DIAS: 9.4 CM/S
RIGHT ICA PROX SYS: 43.4 CM/S
RIGHT ICA/CCA SYS: 1.3
RIGHT INFLOW ARTERY PSV: 63 CM/S
RIGHT MID OUTFLOW PSV: 100 CM/S
RIGHT OUTFLOW VESSEL PSV: 235 CM/S
RIGHT POSTERIOR TIBIAL: 166 MMHG
RIGHT PROX OUTFLOW PSV: 109 CM/S
RIGHT TBI: 0.51
RIGHT TOE PRESSURE: 84 MMHG
RIGHT VERTEBRAL DIAS: 19.5 CM/S
RIGHT VERTEBRAL SYS: 52.2 CM/S

## 2021-06-11 ENCOUNTER — TRANSCRIBE ORDER (OUTPATIENT)
Dept: SCHEDULING | Age: 81
End: 2021-06-11

## 2021-06-11 DIAGNOSIS — I72.3 ANEURYSM OF ILIAC ARTERY (HCC): Primary | ICD-10-CM

## 2021-06-24 NOTE — PROGRESS NOTES
[facility]  [address] [place] [address], [facility] [address]  Phone: [phone]  Fax: [phone]    NEW PATIENT  Pt's YOB: 1940    ASSESSMENT   Diagnoses and all orders for this visit:    1. Lumbar pain  -     AMB POC XRAY, SPINE, LUMBOSACRAL; 4+  -     MRI LUMB SPINE WO CONT; Future  -     methylPREDNISolone (MEDROL DOSEPACK) 4 mg tablet; Per dose pack instructions    2. Neurogenic claudication  -     MRI LUMB SPINE WO CONT; Future    3. DDD (degenerative disc disease), lumbar  -     MRI LUMB SPINE WO CONT; Future    4. Lumbar facet arthropathy  -     MRI LUMB SPINE WO CONT; Future    5. PAD (peripheral artery disease) ([facility])    6. Elevated blood pressure reading         IMPRESSION AND PLAN:  Rahel Floyd is a 79 y.o. right hand dominant male with history of lumbar pain. He reports the gradual onset of pain in the left lower back x 6-8 months that radiates into his left buttock. He denies any numbness, tingling, or electrical like pain in the legs or feet. 1) Pt was given information on lumbar arthritis exercises. 2) A lumbar MRI was ordered for neurogenic claudication, increased pain L>R, severe DDD L5/S1-- concern for severe spinal stenosis; lumbar injections also discussed and indications for surgery. 3) Pt was prescribed Medrol Dosepak. 4) I recommended the patient try water exercise. 5) Mr. Jose Jeronimo has a reminder for a \"due or due soon\" health maintenance. I have asked that he contact his primary care provider, Renan Osuna MD, for follow-up on this health maintenance. 6)  demonstrated consistency with prescribing. Follow-up and Dispositions    · Return in about 4 weeks (around 7/26/2021) for Diagnostic Test follow up.            HISTORY OF PRESENT ILLNESS:  Rahel Floyd is a 79 y.o. right hand dominant male with history of lumbar pain and presents to the office today as a new patient referred by Dr. Jing Madison Prieto. He reports the gradual onset of pain in the left lower back x 6-8 months that radiates into his left buttock. He denies any numbness, tingling, or electrical like pain in the legs or feet or pain in the neck, shoulders, or arms. Pt's symptoms worsen with walking and occasionally when standing 1+ hour. He denies a history of lumbar surgery. He is not taking any anticoagulants at this time. He denies taking prednisone. Of note, he had a stent placed 4-5 years ago by Dr. Cara Shelley and had a recent angiogram. According to the patient, he was told this is unrelated to his lower back pain. Pt desires to proceed with a lumbar MRI. Of note, a few years ago, pt was found to have prostate stones. He was diagnosed with esophageal cancer a few years ago which was removed at [facility]. He is a nonsmoker. Pt notes he received both doses of the COVID-19 vaccination. He is taking vitamin D 1000 units daily. Pain Scale: 4/10      PCP: Parth Calvo MD    Past Medical History:   Diagnosis Date    Adverse effect of anesthesia     slow to wake    Carotid artery disease ([address]     s/p left CEA 2006 with recurrent stenosis treated with stenting May 2011    Cataract     COPD (chronic obstructive pulmonary disease) ([address]     emphysema    DJD (degenerative joint disease)     Esophageal cancer ([address]     Polyps removed    GERD (gastroesophageal reflux disease)     History of tobacco use     HTN (hypertension)     Hyperlipidemia     Normal nuclear stress test 05/27/2011    No ischemia or prior infarction. EF 64%. Neg EKG on max EST.   Ex time 5:15.    Other ill-defined conditions(799.89)     stent in carotid artery-left    PAD (peripheral artery disease) ([address]     followed by Dr Cara Shelley, apparently abnormal Doppler testing of LLE    Stroke 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        Social History     Socioeconomic History    Marital status:      Spouse name: Not on file    Number of children: Not on file    Years of education: Not on file    Highest education level: Not on file   Occupational History    Not on file   Tobacco Use    Smoking status: Former Smoker     Years: 50.00     Quit date: 4/5/2011     Years since quitting: 10.2    Smokeless tobacco: Never Used   Substance and Sexual Activity    Alcohol use: Yes     Comment: every 2-3 weeks 1?2 pint    Drug use: No    Sexual activity: Not on file   Other Topics Concern    Not on file   Social History Narrative    Not on file     Social Determinants of Health     Financial Resource Strain:     Difficulty of Paying Living Expenses:    Food Insecurity:     Worried About Running Out of Food in the Last Year:     [address] in the Last Year:    Transportation Needs:     Lack of Transportation (Medical):  Lack of Transportation (Non-Medical):    Physical Activity:     Days of Exercise per Week:     Minutes of Exercise per Session:    Stress:     Feeling of Stress :    Social Connections:     Frequency of Communication with Friends and Family:     Frequency of Social Gatherings with Friends and Family:     Attends [employer] Services:     Active Member of Clubs or Organizations:     Attends Club or Organization Meetings:     Marital Status:    Intimate Partner Violence:     Fear of Current or Ex-Partner:     Emotionally Abused:     Physically Abused:     Sexually Abused:        Current Outpatient Medications   Medication Sig Dispense Refill    losartan (COZAAR) 100 mg tablet       pantoprazole (PROTONIX) 40 mg tablet       aspirin delayed-release 81 mg tablet Take 81 mg by mouth daily.  methylPREDNISolone (MEDROL DOSEPACK) 4 mg tablet Per dose pack instructions 1 Dose Pack 0    fluticasone-umeclidinium-vilanterol (Trelegy Ellipta) 100-62.5-25 mcg inhaler Take 1 Puff by inhalation daily. 1 Inhaler 0    atorvastatin (LIPITOR) 80 mg tablet Take 1 tablet by mouth daily.  30 tablet 0    losartan (COZAAR) 50 mg tablet Take 100 mg by mouth daily. (Patient not taking: Reported on 6/28/2021)      clopidogrel (PLAVIX) 75 mg tablet Take 1 Tab by mouth daily. Indications: PERIPHERAL ARTERIAL THROMBOEMBOLISM PREVENTION (Patient not taking: Reported on 6/28/2021) 30 Tab 2    esomeprazole (NEXIUM) 40 mg capsule Take 40 mg by mouth daily. (Patient not taking: Reported on 6/28/2021)         No Known Allergies    REVIEW OF SYSTEMS    Constitutional: Negative for fever, chills, or weight change. Respiratory: Negative for cough or shortness of breath. Cardiovascular: Negative for chest pain or palpitations. Gastrointestinal: Negative for acid reflux, change in bowel habits, or constipation. Genitourinary: Negative for dysuria and flank pain. Musculoskeletal: Positive for lumbar pain and leg pain. Skin: Negative for rash. Neurological: Negative for headaches, dizziness, or numbness. Endo/Heme/Allergies: Negative for increased bruising. Psychiatric/Behavioral: Negative for difficulty with sleep. As per HPI    PHYSICAL EXAMINATION  Visit Vitals  BP (!) 167/91 (BP 1 Location: Right arm, BP Patient Position: Sitting)   Pulse 64   Temp 98 °F (36.7 °C) (Temporal)   Resp 16   Ht 5' 5\" (1.651 m)   Wt 149 lb 3.2 oz (67.7 kg)   SpO2 95%   BMI 24.83 kg/m²       Constitutional: Awake, alert, and in no acute distress. HEENT: Normocephalic. Atraumatic. Oropharynx is moist and clear. PERRL. EOMI. Sclerae are nonicteric  Cardiovascular: Regular rate and rhythm  Lungs: Clear to auscultation bilaterally  Abdomen: Soft and nontender. Bowel sounds are present  Neurological: 1+ symmetrical DTRs in the upper extremities. 1+ symmetrical DTRs in the lower extremities. Sensation to light touch is intact. Negative Blake's sign bilaterally. Skin: warm, dry, and intact. Musculoskeletal: Moderate pain with extension, and improved with forward flexion. Pain with axial loading. No pain with internal or external rotation of his hips.  Negative straight leg raise bilaterally. No pain with heel or toe walking. No difficulty with the single leg stance bilaterally      Biceps  Triceps Deltoids Wrist Ext Wrist Flex Hand Intrin   Right +4/5 +4/5 +4/5 +4/5 +4/5 +4/5   Left +4/5 +4/5 +4/5 +4/5 +4/5 +4/5      Hip Flex  Quads Hamstrings Ankle DF EHL Ankle PF   Right +4/5 +4/5 +4/5 +4/5 +4/5 +4/5   Left +4/5 +4/5 +4/5 +4/5 +4/5 +4/5     IMAGING:    Lumbar spine 4V x-rays from 6/28/2021 were personally reviewed with the patient and demonstrated:  Degenerative joint findings, L>R, in the hips. Vascular stent present. Multilevel degenerative facets. Severe degenerative disc at L5-S1. Listhesis at L4-5. Degenerative disc L3-4. Atherosclerosis. Movement at L4-5. Written by Jordan Khan, as dictated by Monica Espana MD.  I, Dr. Monica Espana confirm that all documentation is accurate.

## 2021-06-28 ENCOUNTER — OFFICE VISIT (OUTPATIENT)
Dept: ORTHOPEDIC SURGERY | Age: 81
End: 2021-06-28
Payer: MEDICARE

## 2021-06-28 VITALS
WEIGHT: 149.2 LBS | DIASTOLIC BLOOD PRESSURE: 91 MMHG | RESPIRATION RATE: 16 BRPM | SYSTOLIC BLOOD PRESSURE: 167 MMHG | TEMPERATURE: 98 F | HEART RATE: 64 BPM | HEIGHT: 65 IN | BODY MASS INDEX: 24.86 KG/M2 | OXYGEN SATURATION: 95 %

## 2021-06-28 DIAGNOSIS — I73.9 PAD (PERIPHERAL ARTERY DISEASE) (HCC): ICD-10-CM

## 2021-06-28 DIAGNOSIS — R03.0 ELEVATED BLOOD PRESSURE READING: ICD-10-CM

## 2021-06-28 DIAGNOSIS — G95.19 NEUROGENIC CLAUDICATION (HCC): ICD-10-CM

## 2021-06-28 DIAGNOSIS — M54.50 LUMBAR PAIN: Primary | ICD-10-CM

## 2021-06-28 DIAGNOSIS — M51.36 DDD (DEGENERATIVE DISC DISEASE), LUMBAR: ICD-10-CM

## 2021-06-28 DIAGNOSIS — M47.816 LUMBAR FACET ARTHROPATHY: ICD-10-CM

## 2021-06-28 PROCEDURE — G8753 SYS BP > OR = 140: HCPCS | Performed by: PHYSICAL MEDICINE & REHABILITATION

## 2021-06-28 PROCEDURE — G8755 DIAS BP > OR = 90: HCPCS | Performed by: PHYSICAL MEDICINE & REHABILITATION

## 2021-06-28 PROCEDURE — G8420 CALC BMI NORM PARAMETERS: HCPCS | Performed by: PHYSICAL MEDICINE & REHABILITATION

## 2021-06-28 PROCEDURE — 72110 X-RAY EXAM L-2 SPINE 4/>VWS: CPT | Performed by: PHYSICAL MEDICINE & REHABILITATION

## 2021-06-28 PROCEDURE — G8536 NO DOC ELDER MAL SCRN: HCPCS | Performed by: PHYSICAL MEDICINE & REHABILITATION

## 2021-06-28 PROCEDURE — 99203 OFFICE O/P NEW LOW 30 MIN: CPT | Performed by: PHYSICAL MEDICINE & REHABILITATION

## 2021-06-28 PROCEDURE — 1101F PT FALLS ASSESS-DOCD LE1/YR: CPT | Performed by: PHYSICAL MEDICINE & REHABILITATION

## 2021-06-28 PROCEDURE — G8432 DEP SCR NOT DOC, RNG: HCPCS | Performed by: PHYSICAL MEDICINE & REHABILITATION

## 2021-06-28 PROCEDURE — G8427 DOCREV CUR MEDS BY ELIG CLIN: HCPCS | Performed by: PHYSICAL MEDICINE & REHABILITATION

## 2021-06-28 RX ORDER — PANTOPRAZOLE SODIUM 40 MG/1
TABLET, DELAYED RELEASE ORAL
COMMUNITY
Start: 2021-06-22

## 2021-06-28 RX ORDER — ASPIRIN 81 MG/1
81 TABLET ORAL DAILY
COMMUNITY

## 2021-06-28 RX ORDER — LOSARTAN POTASSIUM 100 MG/1
TABLET ORAL
COMMUNITY
Start: 2021-05-12

## 2021-06-28 RX ORDER — METHYLPREDNISOLONE 4 MG/1
TABLET ORAL
Qty: 1 DOSE PACK | Refills: 0 | Status: SHIPPED | OUTPATIENT
Start: 2021-06-28 | End: 2021-09-01 | Stop reason: ALTCHOICE

## 2021-06-28 NOTE — LETTER
6/28/2021    Patient: Savannah Pa   YOB: 1940   Date of Visit: 6/28/2021     Lisa Cruz MD  [address] [id number]  Via Fax: [phone]    Dear Lisa Cruz MD,      Thank you for referring Mr. Mónica Milner to [address] [place] [address] [facility] for evaluation. My notes for this consultation are attached. If you have questions, please do not hesitate to call me. I look forward to following your patient along with you.       Sincerely,    Raheel Upton MD

## 2021-06-28 NOTE — PATIENT INSTRUCTIONS
Low Back Arthritis: Exercises  Introduction  Here are some examples of typical rehabilitation exercises for your condition. Start each exercise slowly. Ease off the exercise if you start to have pain. Your doctor or physical therapist will tell you when you can start these exercises and which ones will work best for you. When you are not being active, find a comfortable position for rest. Some people are comfortable on the floor or a medium-firm bed with a small pillow under their head and another under their knees. Some people prefer to lie on their side with a pillow between their knees. Don't stay in one position for too long. Take short walks (10 to 20 minutes) every 2 to 3 hours. Avoid slopes, hills, and stairs until you feel better. Walk only distances you can manage without pain, especially leg pain. How to do the exercises  Pelvic tilt   1. Lie on your back with your knees bent. 2. \"Brace\" your stomachtighten your muscles by pulling in and imagining your belly button moving toward your spine. 3. Press your lower back into the floor. You should feel your hips and pelvis rock back. 4. Hold for 6 seconds while breathing smoothly. 5. Relax and allow your pelvis and hips to rock forward. 6. Repeat 8 to 12 times. Back stretches   1. Get down on your hands and knees on the floor. 2. Relax your head and allow it to droop. Round your back up toward the ceiling until you feel a nice stretch in your upper, middle, and lower back. Hold this stretch for as long as it feels comfortable, or about 15 to 30 seconds. 3. Return to the starting position with a flat back while you are on your hands and knees. 4. Let your back sway by pressing your stomach toward the floor. Lift your buttocks toward the ceiling. 5. Hold this position for 15 to 30 seconds. 6. Repeat 2 to 4 times. Follow-up care is a key part of your treatment and safety.  Be sure to make and go to all appointments, and call your doctor if you are having problems. It's also a good idea to know your test results and keep a list of the medicines you take. Where can you learn more? Go to http://www.[employer].com/  Enter T094 in the search box to learn more about \"Low Back Arthritis: Exercises. \"  Current as of: November 16, 2020               Content Version: 12.8  © 8537-6566 [employer]. Care instructions adapted under license by [employer] (which disclaims liability or warranty for this information). If you have questions about a medical condition or this instruction, always ask your healthcare professional. [place] [address] any warranty or liability for your use of this information.

## 2021-07-05 DIAGNOSIS — G95.19 NEUROGENIC CLAUDICATION (HCC): ICD-10-CM

## 2021-07-05 DIAGNOSIS — M51.36 DDD (DEGENERATIVE DISC DISEASE), LUMBAR: ICD-10-CM

## 2021-07-05 DIAGNOSIS — M54.50 LUMBAR PAIN: ICD-10-CM

## 2021-07-05 DIAGNOSIS — M47.816 LUMBAR FACET ARTHROPATHY: ICD-10-CM

## 2021-07-22 ENCOUNTER — HOSPITAL ENCOUNTER (OUTPATIENT)
Dept: MRI IMAGING | Age: 81
Discharge: HOME | End: 2021-07-22
Attending: PHYSICAL MEDICINE & REHABILITATION
Payer: MEDICARE

## 2021-07-22 PROCEDURE — 72148 MRI LUMBAR SPINE W/O DYE: CPT

## 2021-07-30 ENCOUNTER — HOSPITAL ENCOUNTER (OUTPATIENT)
Dept: CT IMAGING | Age: 81
Discharge: HOME | End: 2021-07-30
Attending: SURGERY
Payer: MEDICARE

## 2021-07-30 DIAGNOSIS — I72.3 ANEURYSM OF ILIAC ARTERY (HCC): ICD-10-CM

## 2021-07-30 LAB — CREAT UR-MCNC: 1.3 MG/DL (ref 0.6–1.3)

## 2021-07-30 PROCEDURE — 74011000636 HC RX REV CODE- 636: Performed by: SURGERY

## 2021-07-30 PROCEDURE — 74174 CTA ABD&PLVS W/CONTRAST: CPT

## 2021-07-30 PROCEDURE — 82565 ASSAY OF CREATININE: CPT

## 2021-07-30 RX ADMIN — IOPAMIDOL 100 ML: 755 INJECTION, SOLUTION INTRAVENOUS at 11:27

## 2021-08-03 ENCOUNTER — OFFICE VISIT (OUTPATIENT)
Dept: ORTHOPEDIC SURGERY | Age: 81
End: 2021-08-03
Payer: MEDICARE

## 2021-08-03 VITALS
DIASTOLIC BLOOD PRESSURE: 91 MMHG | HEART RATE: 59 BPM | OXYGEN SATURATION: 98 % | BODY MASS INDEX: 24.99 KG/M2 | SYSTOLIC BLOOD PRESSURE: 179 MMHG | HEIGHT: 65 IN | TEMPERATURE: 97.2 F | WEIGHT: 150 LBS | RESPIRATION RATE: 16 BRPM

## 2021-08-03 DIAGNOSIS — R03.0 ELEVATED BLOOD PRESSURE READING: ICD-10-CM

## 2021-08-03 DIAGNOSIS — M48.062 SPINAL STENOSIS OF LUMBAR REGION WITH NEUROGENIC CLAUDICATION: Primary | ICD-10-CM

## 2021-08-03 DIAGNOSIS — M47.816 LUMBAR FACET ARTHROPATHY: ICD-10-CM

## 2021-08-03 DIAGNOSIS — I73.9 PAD (PERIPHERAL ARTERY DISEASE) (HCC): ICD-10-CM

## 2021-08-03 DIAGNOSIS — M62.838 MUSCLE SPASM: ICD-10-CM

## 2021-08-03 PROCEDURE — G8754 DIAS BP LESS 90: HCPCS | Performed by: PHYSICAL MEDICINE & REHABILITATION

## 2021-08-03 PROCEDURE — 99214 OFFICE O/P EST MOD 30 MIN: CPT | Performed by: PHYSICAL MEDICINE & REHABILITATION

## 2021-08-03 PROCEDURE — G8510 SCR DEP NEG, NO PLAN REQD: HCPCS | Performed by: PHYSICAL MEDICINE & REHABILITATION

## 2021-08-03 PROCEDURE — G8420 CALC BMI NORM PARAMETERS: HCPCS | Performed by: PHYSICAL MEDICINE & REHABILITATION

## 2021-08-03 PROCEDURE — G8427 DOCREV CUR MEDS BY ELIG CLIN: HCPCS | Performed by: PHYSICAL MEDICINE & REHABILITATION

## 2021-08-03 PROCEDURE — G8536 NO DOC ELDER MAL SCRN: HCPCS | Performed by: PHYSICAL MEDICINE & REHABILITATION

## 2021-08-03 PROCEDURE — G8753 SYS BP > OR = 140: HCPCS | Performed by: PHYSICAL MEDICINE & REHABILITATION

## 2021-08-03 PROCEDURE — 1101F PT FALLS ASSESS-DOCD LE1/YR: CPT | Performed by: PHYSICAL MEDICINE & REHABILITATION

## 2021-08-03 RX ORDER — GABAPENTIN 100 MG/1
CAPSULE ORAL
Qty: 90 CAPSULE | Refills: 1 | Status: SHIPPED | OUTPATIENT
Start: 2021-08-03 | End: 2021-10-14 | Stop reason: SINTOL

## 2021-08-03 NOTE — PROGRESS NOTES
Do Cazares presents today for   Chief Complaint   Patient presents with    Follow-up       Is someone accompanying this pt? no    Is the patient using any DME equipment during OV? no    Coordination of Care:  1. Have you been to the ER, urgent care clinic since your last visit? Hospitalized since your last visit? no    2. Have you seen or consulted any other health care providers outside of the [address] [place] [address] since your last visit? Include any pap smears or colon screening.  no

## 2021-08-03 NOTE — PATIENT INSTRUCTIONS
Low Back Arthritis: Exercises  Introduction  Here are some examples of typical rehabilitation exercises for your condition. Start each exercise slowly. Ease off the exercise if you start to have pain. Your doctor or physical therapist will tell you when you can start these exercises and which ones will work best for you. When you are not being active, find a comfortable position for rest. Some people are comfortable on the floor or a medium-firm bed with a small pillow under their head and another under their knees. Some people prefer to lie on their side with a pillow between their knees. Don't stay in one position for too long. Take short walks (10 to 20 minutes) every 2 to 3 hours. Avoid slopes, hills, and stairs until you feel better. Walk only distances you can manage without pain, especially leg pain. How to do the exercises  Pelvic tilt   1. Lie on your back with your knees bent. 2. \"Brace\" your stomachtighten your muscles by pulling in and imagining your belly button moving toward your spine. 3. Press your lower back into the floor. You should feel your hips and pelvis rock back. 4. Hold for 6 seconds while breathing smoothly. 5. Relax and allow your pelvis and hips to rock forward. 6. Repeat 8 to 12 times. Back stretches   1. Get down on your hands and knees on the floor. 2. Relax your head and allow it to droop. Round your back up toward the ceiling until you feel a nice stretch in your upper, middle, and lower back. Hold this stretch for as long as it feels comfortable, or about 15 to 30 seconds. 3. Return to the starting position with a flat back while you are on your hands and knees. 4. Let your back sway by pressing your stomach toward the floor. Lift your buttocks toward the ceiling. 5. Hold this position for 15 to 30 seconds. 6. Repeat 2 to 4 times. Follow-up care is a key part of your treatment and safety.  Be sure to make and go to all appointments, and call your doctor if you are having problems. It's also a good idea to know your test results and keep a list of the medicines you take. Where can you learn more? Go to http://www.[employer]com/  Enter T094 in the search box to learn more about \"Low Back Arthritis: Exercises. \"  Current as of: November 16, 2020               Content Version: 12.8  © 7825-7022 [employer]. Care instructions adapted under license by [employer] (which disclaims liability or warranty for this information). If you have questions about a medical condition or this instruction, always ask your healthcare professional. [place] [address] any warranty or liability for your use of this information.

## 2021-08-03 NOTE — PROGRESS NOTES
[facility]  [address] [place], [facility] [address]  Phone: [phone]  Fax: [phone]    Pt's YOB: 1940    ASSESSMENT   Diagnoses and all orders for this visit:    1. Spinal stenosis of lumbar region with neurogenic claudication  -     gabapentin (Neurontin) 100 mg capsule; Take 1 cap by mouth in the morning and 2 caps at night as directed. 2. Lumbar facet arthropathy    3. Muscle spasm    4. Elevated blood pressure reading         IMPRESSION AND PLAN:  Tone Urena is a 79 y.o. right hand dominant male with history of lumbar pain. He complains of pain across the lower back, R>L, that intermittently radiates down the right leg. 1) Pt was given information on lumbar arthritis exercises. 2) Discussed treatment options with the patient including medications, steroid injections, and surgical intervention. 3) Pt was prescribed Neurontin 100 mg 1 cap QAM and 2 caps QHS, tapering up as directed. 4) I recommended the patient try water exercise. 5) Mr. Ita Jensen has a reminder for a \"due or due soon\" health maintenance. I have asked that he contact his primary care provider, Joan Ricks MD, for follow-up on this health maintenance and his blood pressure. 6)  demonstrated consistency with prescribing. Follow-up and Dispositions    · Return in about 4 weeks (around 8/31/2021) for Medication follow up. HISTORY OF PRESENT ILLNESS:  Tone Urena is a 79 y.o. right hand dominant male with history of lumbar pain and presents to the office today for MRI follow up. He complains of pain across the lower back, R>L, that intermittently radiates down the right leg. Pt denies any weakness in the legs at this time or left radicular symptoms. Pt notes that he had a abdominal CT on Friday to further assess an aneurysm. He is followed by Dr. Farrah Ruelas and he has had 4 stents placed.  Pt is currently on aspirin 81 mg, vitamin D3, Trelegy, Lipitor, and Protonix but he denies taking neuropathic medications. He admits to increased pain a couple days after taking a Medrol Dosepak. Pt had surgery several years ago for esophageal cancer and notes a history of acid reflux and COPD. Pt at this time desires to proceed with medication evaluation. More than 36 minutes was spent with the pt reviewing his MRI, discussing treatment options, medications, and indications for surgery. Pain Scale: 3/10    PCP: Luci Mayfield MD     Past Medical History:   Diagnosis Date    Adverse effect of anesthesia     slow to wake    Carotid artery disease ([address]     s/p left CEA 2006 with recurrent stenosis treated with stenting May 2011    Cataract     COPD (chronic obstructive [facility] disease) ([facility]     emphysema    DJD (degenerative joint disease)     Esophageal cancer ([facility]     Polyps removed    GERD (gastroesophageal reflux disease)     History of tobacco use     HTN (hypertension)     Hyperlipidemia     Normal nuclear stress test 05/27/2011    No ischemia or prior infarction. EF 64%. Neg EKG on max EST.   Ex time 5:15.    Other ill-defined conditions(799.89)     stent in carotid artery-left    PAD (peripheral artery disease) ([facility]     followed by Dr Nancie Dotson, apparently abnormal Doppler testing of LLE    Stroke 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        Social History     Socioeconomic History    Marital status:      Spouse name: Not on file    Number of children: Not on file    Years of education: Not on file    Highest education level: Not on file   Occupational History    Not on file   Tobacco Use    Smoking status: Former Smoker     Years: 50.00     Quit date: 4/5/2011     Years since quitting: 10.3    Smokeless tobacco: Never Used   Substance and Sexual Activity    Alcohol use: Yes     Comment: every 2-3 weeks 1?2 pint    Drug use: No    Sexual activity: Not on file   Other Topics Concern    Not on file   Social History Narrative    Not on file     Social Determinants of Health     Financial Resource Strain:     Difficulty of Paying Living Expenses:    Food Insecurity:     Worried About Running Out of Food in the Last Year:     [address] in the Last Year:    Transportation Needs:     Lack of Transportation (Medical):  Lack of Transportation (Non-Medical):    Physical Activity:     Days of Exercise per Week:     Minutes of Exercise per Session:    Stress:     Feeling of Stress :    Social Connections:     Frequency of Communication with Friends and Family:     Frequency of Social Gatherings with Friends and Family:     Attends [employer] Services:     Active Member of Clubs or Organizations:     Attends Club or Organization Meetings:     Marital Status:    Intimate Partner Violence:     Fear of Current or Ex-Partner:     Emotionally Abused:     Physically Abused:     Sexually Abused:        Current Outpatient Medications   Medication Sig Dispense Refill    gabapentin (Neurontin) 100 mg capsule Take 1 cap by mouth in the morning and 2 caps at night as directed. 90 Capsule 1    losartan (COZAAR) 100 mg tablet       pantoprazole (PROTONIX) 40 mg tablet       aspirin delayed-release 81 mg tablet Take 81 mg by mouth daily.  fluticasone-umeclidinium-vilanterol (Trelegy Ellipta) 100-62.5-25 mcg inhaler Take 1 Puff by inhalation daily. 1 Inhaler 0    atorvastatin (LIPITOR) 80 mg tablet Take 1 tablet by mouth daily. 30 tablet 0    methylPREDNISolone (MEDROL DOSEPACK) 4 mg tablet Per dose pack instructions (Patient not taking: Reported on 8/3/2021) 1 Dose Pack 0    losartan (COZAAR) 50 mg tablet Take 100 mg by mouth daily. (Patient not taking: Reported on 6/28/2021)      clopidogrel (PLAVIX) 75 mg tablet Take 1 Tab by mouth daily.  Indications: PERIPHERAL ARTERIAL THROMBOEMBOLISM PREVENTION (Patient not taking: Reported on 6/28/2021) 30 Tab 2    esomeprazole (NEXIUM) 40 mg capsule Take 40 mg by mouth daily. (Patient not taking: Reported on 8/3/2021)         No Known Allergies      REVIEW OF SYSTEMS    Constitutional: Negative for fever, chills, or weight change. Respiratory: Negative for cough or shortness of breath. Cardiovascular: Negative for chest pain or palpitations. Gastrointestinal: Negative for acid reflux, change in bowel habits, or constipation. Genitourinary: Negative for dysuria and flank pain. Musculoskeletal: Positive for lumbar pain. Neurological: Negative for headaches, dizziness, or numbness. Endo/Heme/Allergies: Negative for increased bruising. Psychiatric/Behavioral: Negative for difficulty with sleep. As per HPI    PHYSICAL EXAMINATION  Visit Vitals  BP (!) 179/91 (BP 1 Location: Left upper arm, BP Patient Position: Sitting)   Pulse (!) 59   Temp 97.2 °F (36.2 °C) (Temporal)   Resp 16   Ht 5' 5\" (1.651 m)   Wt 150 lb (68 kg)   SpO2 98%   BMI 24.96 kg/m²       Constitutional: Awake, alert, and in no acute distress. Neurological: Sensation to light touch is intact. Negative Blake's sign bilaterally. Skin: warm, dry, and intact. Musculoskeletal: Tenderness to palpation in the lumbar region. Moderate pain with extension and improvement with forward flexion. Pain with axial loading. No pain with internal or external rotation of his hips. Negative straight leg raise bilaterally. Hip Flex  Quads Hamstrings Ankle DF EHL Ankle PF   Right +4/5 +4/5 +4/5 +4/5 +4/5 +4/5   Left +4/5 +4/5 +4/5 +4/5 +4/5 +4/5     IMAGING:    Lumbar spine MRI form 7/22/2021 was personally reviewed with the patient and demonstrated:  Results from Orders Only encounter on 07/05/21    MRI LUMB SPINE WO CONT    Narrative  EXAM: Lumbar MRI without contrast    CLINICAL INDICATION: neurogenic claudication, increased pain L>R, severe DDD  L5/S1    TECHNIQUE: MRI of the lumbar spine without contrast obtained.  Multiplanar  multisequence MR images of the lumbar spine obtained. IV Contrast: None    COMPARISON: 10/21/2015    FINDINGS:  All numbering assumes 5 lumbar type vertebrae. Postoperative Changes: None    Alignment:  There is anterolisthesis of L4 on L5 by 6 mm. .    Osseous structures/Marrow: No evidence of acute fracture. .    The cord terminates at L1. No cord signal abnormalities appreciated. Retroperitoneum/Incidental: The abdominal aorta is without evidence of aneurysm. No paraaortic adenopathy appreciated. A likely cyst is noted in the left kidney. It is larger than prior MR measuring 2 x 1.4 cm. Lower Thoracic Spine: The majority of the lower thoracic spine is only  visualized on the sagittal views. No significant canal or neural foraminal  stenosis appreciated. T12-L1: No significant canal or neural foraminal stenosis. L1-L2 level: Mild broad-based disc bulge and mild facet hypertrophy with mild  ligament flavum buckling. This is unchanged. No significant canal stenosis. Minimal neural foraminal narrowing is noted. L2-L3: Mild to moderate facet arthropathy with ligamentum flavum buckling is  present. Broad-based disc bulge with bulges extending into the neural foramina  bilaterally and into the extraforaminal spaces. This is relatively similar to  prior imaging. Mild canal narrowing is noted. There is narrowing of the lateral  recesses bilaterally with potential abutment of the descending nerve roots as  they cross the disc space. Mild to moderate bilateral neural foraminal narrowing  is noted. These findings are mildly progressed. L3-L4: Moderate facet arthropathy with mild ligamentum flavum buckling and mild  epidural fat are noted. Broad-based disc osteophyte formation is noted. Mild  canal narrowing is noted. Mild narrowing of the lateral recesses are noted. No  significant compression of the descending nerve roots appreciated. Moderate  bilateral neural foraminal narrowing is noted. These findings are unchanged.     L4-L5: Anterolisthesis with uncovering of the disc is noted. Broad-based disc  bulge with facet hypertrophy and ligamentum flavum buckling are present. There  is moderate canal narrowing. Lateral recesses are significantly narrowed with  likely compression of the descending nerve roots. This is progressed. Moderate  bilateral neural foraminal narrowing is noted. There is a low signal focus in  the left lateral recess just below the disc level that extends 8 mm below the  disc level. It is 4 x 6 mm wide. This compresses the descending left L5 nerve  root. This is new since prior imaging. L5-S1: Broad-based disc bulge is noted. Facet arthropathy is present. No  significant canal stenosis. Moderate neural foraminal narrowing is noted. This  likely abutment of the exiting nerve roots by the extraforaminal disc  bilaterally. These findings are similar to prior imaging. Impression  1. New low signal focus in the left lateral recess just below the L4-L5 disc  level compressing the left L5 nerve root. This is favored to be sequestered  disc. 2.  Progressive degenerative changes at L4-L5 with moderate canal narrowing and  moderate bilateral neural foraminal narrowing. Severe lateral recess narrowing  bilaterally compressing the descending L5 nerve roots. 3.  Progressive degenerative changes at L2-L3 with mild to moderate lateral  recess narrowing and potential abutment of the descending nerve roots. Written by Umair Aguilar, as dictated by Elisabeth Jennings MD.  I, Dr. Elisabeth Jennings confirm that all documentation is accurate.

## 2021-08-03 NOTE — LETTER
8/6/2021    Patient: Ty Sandoval   YOB: 1940   Date of Visit: 8/3/2021     Akanksha Chua MD  [id number] [place] [phone]  Via Fax: [phone]    Dear Akanksha Chua MD,      Thank you for referring Mr. Irais Mijares to [facility] for evaluation. My notes for this consultation are attached. If you have questions, please do not hesitate to call me. I look forward to following your patient along with you.       Sincerely,    Romana Degree, MD

## 2021-09-01 ENCOUNTER — OFFICE VISIT (OUTPATIENT)
Dept: ORTHOPEDIC SURGERY | Age: 81
End: 2021-09-01
Payer: MEDICARE

## 2021-09-01 VITALS
HEIGHT: 65 IN | DIASTOLIC BLOOD PRESSURE: 67 MMHG | HEART RATE: 62 BPM | BODY MASS INDEX: 24.53 KG/M2 | OXYGEN SATURATION: 94 % | TEMPERATURE: 97.8 F | SYSTOLIC BLOOD PRESSURE: 107 MMHG | WEIGHT: 147.2 LBS

## 2021-09-01 DIAGNOSIS — M62.838 MUSCLE SPASM: ICD-10-CM

## 2021-09-01 DIAGNOSIS — I73.9 PAD (PERIPHERAL ARTERY DISEASE) (HCC): ICD-10-CM

## 2021-09-01 DIAGNOSIS — M47.816 LUMBAR FACET ARTHROPATHY: ICD-10-CM

## 2021-09-01 DIAGNOSIS — M48.062 SPINAL STENOSIS OF LUMBAR REGION WITH NEUROGENIC CLAUDICATION: Primary | ICD-10-CM

## 2021-09-01 PROCEDURE — 99213 OFFICE O/P EST LOW 20 MIN: CPT | Performed by: PHYSICAL MEDICINE & REHABILITATION

## 2021-09-01 PROCEDURE — 1101F PT FALLS ASSESS-DOCD LE1/YR: CPT | Performed by: PHYSICAL MEDICINE & REHABILITATION

## 2021-09-01 PROCEDURE — G8752 SYS BP LESS 140: HCPCS | Performed by: PHYSICAL MEDICINE & REHABILITATION

## 2021-09-01 PROCEDURE — G8536 NO DOC ELDER MAL SCRN: HCPCS | Performed by: PHYSICAL MEDICINE & REHABILITATION

## 2021-09-01 PROCEDURE — G8432 DEP SCR NOT DOC, RNG: HCPCS | Performed by: PHYSICAL MEDICINE & REHABILITATION

## 2021-09-01 PROCEDURE — G8427 DOCREV CUR MEDS BY ELIG CLIN: HCPCS | Performed by: PHYSICAL MEDICINE & REHABILITATION

## 2021-09-01 PROCEDURE — G8754 DIAS BP LESS 90: HCPCS | Performed by: PHYSICAL MEDICINE & REHABILITATION

## 2021-09-01 PROCEDURE — G8420 CALC BMI NORM PARAMETERS: HCPCS | Performed by: PHYSICAL MEDICINE & REHABILITATION

## 2021-09-01 RX ORDER — MELATONIN
1 DAILY
COMMUNITY

## 2021-09-01 RX ORDER — PREGABALIN 50 MG/1
CAPSULE ORAL
Qty: 90 CAPSULE | Refills: 1 | Status: SHIPPED | OUTPATIENT
Start: 2021-09-01 | End: 2021-10-14 | Stop reason: SINTOL

## 2021-09-01 NOTE — PROGRESS NOTES
[facility]  [address] Norman[address] [place], [facility] [address]  Phone: [phone]  Fax: [phone]    Pt's YOB: 1940    ASSESSMENT   Diagnoses and all orders for this visit:    1. Spinal stenosis of lumbar region with neurogenic claudication  -     pregabalin (Lyrica) 50 mg capsule; Take 1 cap by mouth in the morning and 2 at night as directed. 2. Lumbar facet arthropathy    3. Muscle spasm    4. PAD (peripheral artery disease) ([facility]         IMPRESSION AND PLAN:  Ariel Cantu is a 79 y.o. right hand dominant male with history of lumbar pain and presents to the office today for follow up. He complains of pain across the lower back, R>L, that intermittently radiates down the right leg. Pt started Neurontin 100 mg 1 cap QAM and 2 caps QHS without relief or sedation. 1) Pt was given information on lumbar arthritis exercises. 2) He will discontinue Neurontin 100 mg since it is not effective. 3) Pt was prescribed Lyrica 50 mg 1 cap QAM and 2 caps QHS, tapering up as directed. 4) Pt was given information on the Mediterranean diet. 5) Mr. Lauro Aase has a reminder for a \"due or due soon\" health maintenance. I have asked that he contact his primary care provider, Luci Mayfield MD, for follow-up on this health maintenance. 6)  demonstrated consistency with prescribing. 7) May consider steroid injections pending response to medication  Follow-up and Dispositions    · Return in about 6 weeks (around 10/13/2021) for Medication follow up. HISTORY OF PRESENT ILLNESS:  Ariel Cantu is a 79 y.o. right hand dominant male with history of lumbar pain and presents to the office today for follow up. Pt denies any change in symptoms since his last office visit. He complains of pain across the lower back, R>L, that intermittently radiates down the right leg.  Pt started Neurontin 100 mg 1 cap QAM and 2 caps QHS without relief or sedation. He notes that he had a stent placed by Dr. Gali Whitehead yesterday and is not yet sure if this has helped with his buttock/leg pain. Pt admits that he is not particularly interested in lumbar surgical intervention at this time. Pt desires to proceed with medication evaluation. Of note, he quit smoking about 10 years ago. Pain Scale: 3/10    PCP: Jalyn Vargas MD     Past Medical History:   Diagnosis Date    Adverse effect of anesthesia     slow to wake    Carotid artery disease ([facility]     s/p left CEA 2006 with recurrent stenosis treated with stenting May 2011    Cataract     COPD (chronic obstructive pulmonary disease) ([facility]     emphysema    DJD (degenerative joint disease)     Esophageal cancer ([facility]     Polyps removed    GERD (gastroesophageal reflux disease)     History of tobacco use     HTN (hypertension)     Hyperlipidemia     Normal nuclear stress test 05/27/2011    No ischemia or prior infarction. EF 64%. Neg EKG on max EST.   Ex time 5:15.   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        Social History     Socioeconomic History    Marital status:      Spouse name: Not on file    Number of children: Not on file    Years of education: Not on file    Highest education level: Not on file   Occupational History    Not on file   Tobacco Use    Smoking status: Former Smoker     Years: 50.00     Quit date: 4/5/2011     Years since quitting: 10.4    Smokeless tobacco: Never Used   Substance and Sexual Activity    Alcohol use: Yes     Comment: every 2-3 weeks 1?2 pint    Drug use: No    Sexual activity: Not on file   Other Topics Concern    Not on file   Social History Narrative    Not on file     Social Determinants of Health     Financial Resource Strain:     Difficulty of Paying Living Expenses:    Food Insecurity:     Worried About [facility] in the Last Year:     [address] in the Last Year:    Transportation Needs:     Lack of Transportation (Medical):  Lack of Transportation (Non-Medical):    Physical Activity:     Days of Exercise per Week:     Minutes of Exercise per Session:    Stress:     Feeling of Stress :    Social Connections:     Frequency of Communication with Friends and Family:     Frequency of Social Gatherings with Friends and Family:     Attends Worship Services:     Active Member of Clubs or Organizations:     Attends Club or Organization Meetings:     Marital Status:    Intimate Partner Violence:     Fear of Current or Ex-Partner:     Emotionally Abused:     Physically Abused:     Sexually Abused:        Current Outpatient Medications   Medication Sig Dispense Refill    cholecalciferol (VITAMIN D3) (1000 Units /25 mcg) tablet Take 1 Tablet by mouth daily.  pregabalin (Lyrica) 50 mg capsule Take 1 cap by mouth in the morning and 2 at night as directed. 90 Capsule 1    gabapentin (Neurontin) 100 mg capsule Take 1 cap by mouth in the morning and 2 caps at night as directed. 90 Capsule 1    losartan (COZAAR) 100 mg tablet       pantoprazole (PROTONIX) 40 mg tablet       aspirin delayed-release 81 mg tablet Take 81 mg by mouth daily.  fluticasone-umeclidinium-vilanterol (Trelegy Ellipta) 100-62.5-25 mcg inhaler Take 1 Puff by inhalation daily. 1 Inhaler 0    atorvastatin (LIPITOR) 80 mg tablet Take 1 tablet by mouth daily. 30 tablet 0    methylPREDNISolone (MEDROL DOSEPACK) 4 mg tablet Per dose pack instructions (Patient not taking: Reported on 8/3/2021) 1 Dose Pack 0    losartan (COZAAR) 50 mg tablet Take 100 mg by mouth daily.  clopidogrel (PLAVIX) 75 mg tablet Take 1 Tab by mouth daily.  Indications: PERIPHERAL ARTERIAL THROMBOEMBOLISM PREVENTION (Patient not taking: Reported on 6/28/2021) 30 Tab 2    esomeprazole (NEXIUM) 40 mg capsule Take 40 mg by mouth daily. (Patient not taking: Reported on 8/3/2021)         No Known Allergies      REVIEW OF SYSTEMS    Constitutional: Negative for fever, chills, or weight change. Respiratory: Negative for cough or shortness of breath. Cardiovascular: Negative for chest pain or palpitations. Gastrointestinal: Negative for acid reflux, change in bowel habits, or constipation. Genitourinary: Negative for dysuria and flank pain. Musculoskeletal: Positive for lumbar pain. Neurological: Negative for headaches, dizziness, or numbness. Endo/Heme/Allergies: Negative for increased bruising. Psychiatric/Behavioral: Negative for difficulty with sleep. As per HPI    PHYSICAL EXAMINATION  Visit Vitals  /67 (BP 1 Location: Right upper arm, BP Patient Position: Sitting, BP Cuff Size: Small adult)   Pulse 62   Temp 97.8 °F (36.6 °C) (Skin)   Ht 5' 5\" (1.651 m)   Wt 147 lb 3.2 oz (66.8 kg)   SpO2 94%   BMI 24.50 kg/m²       Constitutional: Awake, alert, and in no acute distress. Neurological: Sensation to light touch is intact. Skin: warm, dry, and intact. Musculoskeletal: Tenderness to palpation in the lumbar region. Moderate pain with extension and improvement with forward flexion. Pain with axial loading. No pain with internal or external rotation of his hips. Negative straight leg raise bilaterally. Hip Flex  Quads Hamstrings Ankle DF EHL Ankle PF   Right +4/5 +4/5 +4/5 +4/5 +4/5 +4/5   Left +4/5 +4/5 +4/5 +4/5 +4/5 +4/5     IMAGING:    Lumbar spine MRI form 7/22/2021 was personally reviewed with the patient and demonstrated:  Results from Orders Only encounter on 07/05/21     MRI LUMB SPINE WO CONT     Narrative  EXAM: Lumbar MRI without contrast     CLINICAL INDICATION: neurogenic claudication, increased pain L>R, severe DDD  L5/S1     TECHNIQUE: MRI of the lumbar spine without contrast obtained.  Multiplanar  multisequence MR images of the lumbar spine obtained.     IV Contrast: None     COMPARISON: 10/21/2015     FINDINGS:  All numbering assumes 5 lumbar type vertebrae.     Postoperative Changes: None     Alignment:  There is anterolisthesis of L4 on L5 by 6 mm. .     Osseous structures/Marrow: No evidence of acute fracture. .     The cord terminates at L1. No cord signal abnormalities appreciated.     Retroperitoneum/Incidental: The abdominal aorta is without evidence of aneurysm. No paraaortic adenopathy appreciated. A likely cyst is noted in the left kidney. It is larger than prior MR measuring 2 x 1.4 cm.     Lower Thoracic Spine: The majority of the lower thoracic spine is only  visualized on the sagittal views. No significant canal or neural foraminal  stenosis appreciated.     T12-L1: No significant canal or neural foraminal stenosis.     L1-L2 level: Mild broad-based disc bulge and mild facet hypertrophy with mild  ligament flavum buckling. This is unchanged. No significant canal stenosis. Minimal neural foraminal narrowing is noted.     L2-L3: Mild to moderate facet arthropathy with ligamentum flavum buckling is  present. Broad-based disc bulge with bulges extending into the neural foramina  bilaterally and into the extraforaminal spaces. This is relatively similar to  prior imaging. Mild canal narrowing is noted. There is narrowing of the lateral  recesses bilaterally with potential abutment of the descending nerve roots as  they cross the disc space. Mild to moderate bilateral neural foraminal narrowing  is noted. These findings are mildly progressed.     L3-L4: Moderate facet arthropathy with mild ligamentum flavum buckling and mild  epidural fat are noted. Broad-based disc osteophyte formation is noted. Mild  canal narrowing is noted. Mild narrowing of the lateral recesses are noted. No  significant compression of the descending nerve roots appreciated. Moderate  bilateral neural foraminal narrowing is noted.  These findings are unchanged.     L4-L5: Anterolisthesis with uncovering of the disc is noted. Broad-based disc  bulge with facet hypertrophy and ligamentum flavum buckling are present. There  is moderate canal narrowing. Lateral recesses are significantly narrowed with  likely compression of the descending nerve roots. This is progressed. Moderate  bilateral neural foraminal narrowing is noted. There is a low signal focus in  the left lateral recess just below the disc level that extends 8 mm below the  disc level. It is 4 x 6 mm wide. This compresses the descending left L5 nerve  root. This is new since prior imaging.     L5-S1: Broad-based disc bulge is noted. Facet arthropathy is present. No  significant canal stenosis. Moderate neural foraminal narrowing is noted. This  likely abutment of the exiting nerve roots by the extraforaminal disc  bilaterally. These findings are similar to prior imaging.     Impression  1. New low signal focus in the left lateral recess just below the L4-L5 disc  level compressing the left L5 nerve root. This is favored to be sequestered  disc.     2.  Progressive degenerative changes at L4-L5 with moderate canal narrowing and  moderate bilateral neural foraminal narrowing. Severe lateral recess narrowing  bilaterally compressing the descending L5 nerve roots.     3.  Progressive degenerative changes at L2-L3 with mild to moderate lateral  recess narrowing and potential abutment of the descending nerve roots. Written by Mich Estes, as dictated by Thomas Kwon MD.  I, Dr. Thomas Kwon confirm that all documentation is accurate.

## 2021-09-01 NOTE — PATIENT INSTRUCTIONS
Low Back Arthritis: Exercises  Introduction  Here are some examples of typical rehabilitation exercises for your condition. Start each exercise slowly. Ease off the exercise if you start to have pain. Your doctor or physical therapist will tell you when you can start these exercises and which ones will work best for you. When you are not being active, find a comfortable position for rest. Some people are comfortable on the floor or a medium-firm bed with a small pillow under their head and another under their knees. Some people prefer to lie on their side with a pillow between their knees. Don't stay in one position for too long. Take short walks (10 to 20 minutes) every 2 to 3 hours. Avoid slopes, hills, and stairs until you feel better. Walk only distances you can manage without pain, especially leg pain. How to do the exercises  Pelvic tilt   1. Lie on your back with your knees bent. 2. \"Brace\" your stomachtighten your muscles by pulling in and imagining your belly button moving toward your spine. 3. Press your lower back into the floor. You should feel your hips and pelvis rock back. 4. Hold for 6 seconds while breathing smoothly. 5. Relax and allow your pelvis and hips to rock forward. 6. Repeat 8 to 12 times. Back stretches   1. Get down on your hands and knees on the floor. 2. Relax your head and allow it to droop. Round your back up toward the ceiling until you feel a nice stretch in your upper, middle, and lower back. Hold this stretch for as long as it feels comfortable, or about 15 to 30 seconds. 3. Return to the starting position with a flat back while you are on your hands and knees. 4. Let your back sway by pressing your stomach toward the floor. Lift your buttocks toward the ceiling. 5. Hold this position for 15 to 30 seconds. 6. Repeat 2 to 4 times. Follow-up care is a key part of your treatment and safety.  Be sure to make and go to all appointments, and call your doctor if you are having problems. It's also a good idea to know your test results and keep a list of the medicines you take. Where can you learn more? Go to http://www.[employer].com/  Enter T094 in the search box to learn more about \"Low Back Arthritis: Exercises. \"  Current as of: November 16, 2020               Content Version: 12.8  © 2006-2021 [employer]. Care instructions adapted under license by [employer] (which disclaims liability or warranty for this information). If you have questions about a medical condition or this instruction, always ask your healthcare professional. [place] [address] any warranty or liability for your use of this information. Learning About the [address] Diet  What is the Mediterranean diet? The Mediterranean diet is a style of eating rather than a diet plan. It features foods eaten in [place], [place], [place] and [place], and other countries along the [facility]. It emphasizes eating foods like fish, fruits, vegetables, beans, high-fiber breads and whole grains, nuts, and olive oil. This style of eating includes limited red meat, cheese, and sweets. Why choose the Mediterranean diet? A Mediterranean-style diet may improve heart health. It contains more fat than other heart-healthy diets. But the fats are mainly from nuts, unsaturated oils (such as fish oils and olive oil), and certain nut or seed oils (such as canola, soybean, or flaxseed oil). These fats may help protect the heart and blood vessels. How can you get started on the Mediterranean diet? Here are some things you can do to switch to a more Mediterranean way of eating. What to eat  · Eat a variety of fruits and vegetables each day, such as grapes, blueberries, tomatoes, broccoli, peppers, figs, olives, spinach, eggplant, beans, lentils, and chickpeas.   · Eat a variety of whole-grain foods each day, such as oats, brown rice, and whole wheat bread, pasta, and couscous. · Eat fish at least 2 times a week. Try tuna, salmon, mackerel, lake trout, herring, or sardines. · Eat moderate amounts of low-fat dairy products, such as milk, cheese, or yogurt. · Eat moderate amounts of poultry and eggs. · Choose healthy (unsaturated) fats, such as nuts, olive oil, and certain nut or seed oils like canola, soybean, and flaxseed. · Limit unhealthy (saturated) fats, such as butter, palm oil, and coconut oil. And limit fats found in animal products, such as meat and dairy products made with whole milk. Try to eat red meat only a few times a month in very small amounts. · Limit sweets and desserts to only a few times a week. This includes sugar-sweetened drinks like soda. The Mediterranean diet may also include red wine with your meal1 glass each day for women and up to 2 glasses a day for men. Tips for eating at home  · Use herbs, spices, garlic, lemon zest, and citrus juice instead of salt to add flavor to foods. · Add avocado slices to your sandwich instead of hill. · Have fish for lunch or dinner instead of red meat. Brush the fish with olive oil, and broil or grill it. · Sprinkle your salad with seeds or nuts instead of cheese. · Cook with olive or canola oil instead of butter or oils that are high in saturated fat. · Switch from 2% milk or whole milk to 1% or fat-free milk. · Dip raw vegetables in a vinaigrette dressing or hummus instead of dips made from mayonnaise or sour cream.  · Have a piece of fruit for dessert instead of a piece of cake. Try baked apples, or have some dried fruit. Tips for eating out  · Try broiled, grilled, baked, or poached fish instead of having it fried or breaded. · Ask your  to have your meals prepared with olive oil instead of butter. · Order dishes made with marinara sauce or sauces made from olive oil. Avoid sauces made from cream or mayonnaise.   · Choose whole-grain breads, whole wheat pasta and pizza crust, brown rice, beans, and lentils. · Cut back on butter or margarine on bread. Instead, you can dip your bread in a small amount of olive oil. · Ask for a side salad or grilled vegetables instead of french fries or chips. Where can you learn more? Go to http://www.[employer].com/  Enter O407 in the search box to learn more about \"Learning About the Mediterranean Diet. \"  Current as of: December 17, 2020               Content Version: 12.8  © 2006-2021 [employer]. Care instructions adapted under license by [employer] (which disclaims liability or warranty for this information). If you have questions about a medical condition or this instruction, always ask your healthcare professional. [address] any warranty or liability for your use of this information.

## 2021-09-01 NOTE — LETTER
9/1/2021    Patient: Alexander Crandall   YOB: 1940   Date of Visit: 9/1/2021     Ngoc Nathan MD  [facility]  [address] [place] [id number]  Via Fax: [phone]    Dear Ngoc Nathan MD,      Thank you for referring Mr. Prabha Hightower to [facility] for evaluation. My notes for this consultation are attached. If you have questions, please do not hesitate to call me. I look forward to following your patient along with you.       Sincerely,    Evie Isaacs MD

## 2021-10-14 ENCOUNTER — OFFICE VISIT (OUTPATIENT)
Dept: ORTHOPEDIC SURGERY | Age: 81
End: 2021-10-14
Payer: MEDICARE

## 2021-10-14 VITALS
WEIGHT: 145 LBS | SYSTOLIC BLOOD PRESSURE: 114 MMHG | BODY MASS INDEX: 24.13 KG/M2 | HEART RATE: 63 BPM | DIASTOLIC BLOOD PRESSURE: 64 MMHG | OXYGEN SATURATION: 95 % | TEMPERATURE: 97.3 F

## 2021-10-14 DIAGNOSIS — M48.062 SPINAL STENOSIS OF LUMBAR REGION WITH NEUROGENIC CLAUDICATION: Primary | ICD-10-CM

## 2021-10-14 DIAGNOSIS — M51.36 DDD (DEGENERATIVE DISC DISEASE), LUMBAR: ICD-10-CM

## 2021-10-14 DIAGNOSIS — M47.816 LUMBAR FACET ARTHROPATHY: ICD-10-CM

## 2021-10-14 PROCEDURE — 99204 OFFICE O/P NEW MOD 45 MIN: CPT | Performed by: NURSE PRACTITIONER

## 2021-10-14 PROCEDURE — G8427 DOCREV CUR MEDS BY ELIG CLIN: HCPCS | Performed by: NURSE PRACTITIONER

## 2021-10-14 PROCEDURE — G8432 DEP SCR NOT DOC, RNG: HCPCS | Performed by: NURSE PRACTITIONER

## 2021-10-14 PROCEDURE — 1101F PT FALLS ASSESS-DOCD LE1/YR: CPT | Performed by: NURSE PRACTITIONER

## 2021-10-14 PROCEDURE — G8752 SYS BP LESS 140: HCPCS | Performed by: NURSE PRACTITIONER

## 2021-10-14 PROCEDURE — G8754 DIAS BP LESS 90: HCPCS | Performed by: NURSE PRACTITIONER

## 2021-10-14 PROCEDURE — G8420 CALC BMI NORM PARAMETERS: HCPCS | Performed by: NURSE PRACTITIONER

## 2021-10-14 PROCEDURE — G8536 NO DOC ELDER MAL SCRN: HCPCS | Performed by: NURSE PRACTITIONER

## 2021-10-14 RX ORDER — DULOXETIN HYDROCHLORIDE 20 MG/1
20 CAPSULE, DELAYED RELEASE ORAL DAILY
Qty: 30 CAPSULE | Refills: 1 | Status: SHIPPED | OUTPATIENT
Start: 2021-10-14 | End: 2021-11-29

## 2021-10-14 NOTE — PROGRESS NOTES
Chief complaint No chief complaint on file. History of Present Illness:  Aicha Jones is a  79 y.o.  male comes in today for follow-up of his back pain with right lower extremity pain down to his knee. His pain is increased with walking. He sees Dr. Domitila Pickens who has tried him on gabapentin but that did not help him so she changed him to Lyrica 50 mg in the morning and 100 mg at night but he states he had terrible side effects from that. He states he felt drunk and lost his balance and developed bumps on the inside of his nose. He had to discontinue it. He states his pain in his leg is increased with walking. He is retired. He is a non-smoker. Physical Exam: The patient is a 25-year-old male well-developed well-nourished who is alert and oriented with a normal mood and affect. He has a full weightbearing antalgic gait. He did not use any assistive device. He has 4 out of 5 strength bilateral lower extremities. Negative straight leg raise. He did have some pain with hyperextension lumbar spine. Assessment and Plan: This is a patient who has lumbar spinal stenosis and facet arthropathy. I will try him on Cymbalta 20 mg daily. This is the low dose and we can always go up from there if needed. We will see him back in 6 weeks for reevaluation. Medications:  Current Outpatient Medications   Medication Sig Dispense Refill    DULoxetine (CYMBALTA) 20 mg capsule Take 1 Capsule by mouth daily. Indications: neuropathic pain 30 Capsule 1    cholecalciferol (VITAMIN D3) (1000 Units /25 mcg) tablet Take 1 Tablet by mouth daily.  losartan (COZAAR) 100 mg tablet       pantoprazole (PROTONIX) 40 mg tablet       aspirin delayed-release 81 mg tablet Take 81 mg by mouth daily.  fluticasone-umeclidinium-vilanterol (Trelegy Ellipta) 100-62.5-25 mcg inhaler Take 1 Puff by inhalation daily. 1 Inhaler 0    atorvastatin (LIPITOR) 80 mg tablet Take 1 tablet by mouth daily.  30 tablet 0 Review of systems:    Past Medical History:   Diagnosis Date    Adverse effect of anesthesia     slow to wake    Carotid artery disease ([facility] [address] [address]     s/p left CEA 2006 with recurrent stenosis treated with stenting May 2011    Cataract     COPD (chronic obstructive pulmonary disease) ([facility] [address]     emphysema    DJD (degenerative joint disease)     Esophageal cancer (HCC)     Polyps removed    GERD (gastroesophageal reflux disease)     History of tobacco use     HTN (hypertension)     Hyperlipidemia     Normal nuclear stress test 05/27/2011    No ischemia or prior infarction. EF 64%. Neg EKG on max EST.   Ex time 5:15.   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     Past Surgical History:   Procedure Laterality Date    HX CAROTID ENDARTERECTOMY  2006    left    HX HEENT      polyps removed from vocal cords    HX OTHER SURGICAL      balloon and stent placed in leg    HX OTHER SURGICAL      carotid stent placed    HX REFRACTIVE SURGERY       Social History     Socioeconomic History    Marital status:      Spouse name: Not on file    Number of children: Not on file    Years of education: Not on file    Highest education level: Not on file   Occupational History    Not on file   Tobacco Use    Smoking status: Former Smoker     Years: 50.00     Quit date: 4/5/2011     Years since quitting: 10.5    Smokeless tobacco: Never Used   Substance and Sexual Activity    Alcohol use: Yes     Comment: every 2-3 weeks 1?2 pint    Drug use: No    Sexual activity: Not on file   Other Topics Concern    Not on file   Social History Narrative    Not on file     Social Determinants of Health     Financial Resource Strain:     Difficulty of Paying Living Expenses:    Food Insecurity:     Worried About Running Out of Food in the Last Year:    [address] in the Last Year:    Transportation Needs:     Lack of Transportation (Medical):  Lack of Transportation (Non-Medical):    Physical Activity:     Days of Exercise per Week:     Minutes of Exercise per Session:    Stress:     Feeling of Stress :    Social Connections:     Frequency of Communication with Friends and Family:     Frequency of Social Gatherings with Friends and Family:     Attends Zoroastrian Services:     Active Member of Clubs or Organizations:     Attends Club or Organization Meetings:     Marital Status:    Intimate Partner Violence:     Fear of Current or Ex-Partner:     Emotionally Abused:     Physically Abused:     Sexually Abused:      Family History   Problem Relation Age of Onset    Cancer Mother        Physical Exam:  Visit Vitals  /64 (BP 1 Location: Right arm, BP Patient Position: Sitting, BP Cuff Size: Adult)   Pulse 63   Temp 97.3 °F (36.3 °C)   Wt 145 lb (65.8 kg)   SpO2 95%   BMI 24.13 kg/m²     Pain Scale: 3/10       has been . reviewed and is appropriate          Diagnoses and all orders for this visit:    1. Spinal stenosis of lumbar region with neurogenic claudication  -     DULoxetine (CYMBALTA) 20 mg capsule; Take 1 Capsule by mouth daily. Indications: neuropathic pain    2. Lumbar facet arthropathy  -     DULoxetine (CYMBALTA) 20 mg capsule; Take 1 Capsule by mouth daily. Indications: neuropathic pain    3. DDD (degenerative disc disease), lumbar  -     DULoxetine (CYMBALTA) 20 mg capsule; Take 1 Capsule by mouth daily. Indications: neuropathic pain            Follow-up and Dispositions    · Return in about 6 weeks (around 11/25/2021) for with Dr. Laverne Cam.              We have informed Zainab Aguilar to notify us for immediate appointment if he has any worsening neurogical symptoms or if an emergency situation presents, then call 911

## 2021-11-29 ENCOUNTER — OFFICE VISIT (OUTPATIENT)
Dept: ORTHOPEDIC SURGERY | Age: 81
End: 2021-11-29
Payer: MEDICARE

## 2021-11-29 VITALS
HEART RATE: 68 BPM | SYSTOLIC BLOOD PRESSURE: 129 MMHG | TEMPERATURE: 97.5 F | OXYGEN SATURATION: 95 % | BODY MASS INDEX: 24.53 KG/M2 | WEIGHT: 147.2 LBS | HEIGHT: 65 IN | DIASTOLIC BLOOD PRESSURE: 60 MMHG | RESPIRATION RATE: 16 BRPM

## 2021-11-29 DIAGNOSIS — M48.062 SPINAL STENOSIS OF LUMBAR REGION WITH NEUROGENIC CLAUDICATION: Primary | ICD-10-CM

## 2021-11-29 DIAGNOSIS — M47.816 LUMBAR FACET ARTHROPATHY: ICD-10-CM

## 2021-11-29 DIAGNOSIS — M62.838 MUSCLE SPASM: ICD-10-CM

## 2021-11-29 DIAGNOSIS — G95.19 NEUROGENIC CLAUDICATION (HCC): ICD-10-CM

## 2021-11-29 DIAGNOSIS — M51.36 DDD (DEGENERATIVE DISC DISEASE), LUMBAR: ICD-10-CM

## 2021-11-29 DIAGNOSIS — I73.9 PAD (PERIPHERAL ARTERY DISEASE) (HCC): ICD-10-CM

## 2021-11-29 PROCEDURE — 1101F PT FALLS ASSESS-DOCD LE1/YR: CPT | Performed by: PHYSICAL MEDICINE & REHABILITATION

## 2021-11-29 PROCEDURE — G8754 DIAS BP LESS 90: HCPCS | Performed by: PHYSICAL MEDICINE & REHABILITATION

## 2021-11-29 PROCEDURE — 99213 OFFICE O/P EST LOW 20 MIN: CPT | Performed by: PHYSICAL MEDICINE & REHABILITATION

## 2021-11-29 PROCEDURE — G8536 NO DOC ELDER MAL SCRN: HCPCS | Performed by: PHYSICAL MEDICINE & REHABILITATION

## 2021-11-29 PROCEDURE — G8420 CALC BMI NORM PARAMETERS: HCPCS | Performed by: PHYSICAL MEDICINE & REHABILITATION

## 2021-11-29 PROCEDURE — G8432 DEP SCR NOT DOC, RNG: HCPCS | Performed by: PHYSICAL MEDICINE & REHABILITATION

## 2021-11-29 PROCEDURE — G8752 SYS BP LESS 140: HCPCS | Performed by: PHYSICAL MEDICINE & REHABILITATION

## 2021-11-29 PROCEDURE — G8427 DOCREV CUR MEDS BY ELIG CLIN: HCPCS | Performed by: PHYSICAL MEDICINE & REHABILITATION

## 2021-11-29 RX ORDER — DULOXETIN HYDROCHLORIDE 30 MG/1
30 CAPSULE, DELAYED RELEASE ORAL 2 TIMES DAILY
Qty: 60 CAPSULE | Refills: 1 | Status: SHIPPED | OUTPATIENT
Start: 2021-11-29 | End: 2022-01-10 | Stop reason: SDUPTHER

## 2021-11-29 NOTE — PROGRESS NOTES
[facility]  [address] [place] [address], [facility] [address]  Phone: [phone]  Fax: [phone]    Pt's YOB: 1940    ASSESSMENT   Diagnoses and all orders for this visit:    1. Spinal stenosis of lumbar region with neurogenic claudication  -     DULoxetine (CYMBALTA) 30 mg capsule; Take 1 Capsule by mouth two (2) times a day. Take 1 cap by mouth daily for 1 week, then increase to 2 caps daily  Indications: neuropathic pain    2. Muscle spasm    3. Lumbar facet arthropathy    4. PAD (peripheral artery disease) ([address]    5. Neurogenic claudication ([address]    6. DDD (degenerative disc disease), lumbar         IMPRESSION AND PLAN:  Marvia Goltz is a 79 y.o. right hand dominant male with history of lumbar pain. He complains of continued lumbar pain. Pt was taking Lyrica 50 mg 1 cap QAM and 2 caps QHS but notes sedation and discontinued this due to side effects. He was started on Cymbalta 20 mg 1 cap QAM as prescribed by Demi Pringle and has been able to tolerate this but has not had a significant improvement in his symptoms. 1) Pt was given information on herniated disc exercises. 2) He will increase his dosage of Cymbalta to 30 mg (from 20 mg) and begiin with 1 cap QAM, tapering up to 2 caps QAM as directed and as tolerated and his symptoms   3) Mr. Leon Cottrell has a reminder for a \"due or due soon\" health maintenance. I have asked that he contact his primary care provider, Marisol Butts MD, for follow-up on this health maintenance. 4)  demonstrated consistency with prescribing. 5) Pt will continue with HEP  Follow-up and Dispositions    · Return in about 6 weeks (around 1/10/2022) for Medication follow up. HISTORY OF PRESENT ILLNESS:  Marvia Goltz is a 79 y.o. right hand dominant male with history of lumbar pain and presents to the office today for follow up. Pt complains of continued lumbar pain.  Pt is taking Lyrica 50 mg 1 cap QAM and 2 caps QHS but notes sedation. He states that he followed up with Hanna Hirsch NP, in 10/2021 and was prescribed Cymbalta 20 mg 1 cap QAM, which he states produced no apparent effect. He also takes aspirin 162 mg daily for anticoagulation and supplements with vitamin D and zinc. He has not been able to tolerate gabapentin and Lyrica due to side effects and sedation. Pt at this time desires to proceed with medication evaluation. Pain Scale: 3/10    PCP: Chaim Elizabeth MD     Past Medical History:   Diagnosis Date    Adverse effect of anesthesia     slow to wake    Carotid artery disease ([facility]     s/p left CEA 2006 with recurrent stenosis treated with stenting May 2011    Cataract     COPD (chronic obstructive pulmonary disease) ([facility]     emphysema    DJD (degenerative joint disease)     Esophageal cancer ([facility]     Polyps removed    GERD (gastroesophageal reflux disease)     History of tobacco use     HTN (hypertension)     Hyperlipidemia     Normal nuclear stress test 05/27/2011    No ischemia or prior infarction. EF 64%. Neg EKG on max EST.   Ex time 5:15.   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        Social History     Socioeconomic History    Marital status:      Spouse name: Not on file    Number of children: Not on file    Years of education: Not on file    Highest education level: Not on file   Occupational History    Not on file   Tobacco Use    Smoking status: Former Smoker     Years: 50.00     Quit date: 4/5/2011     Years since quitting: 10.6    Smokeless tobacco: Never Used   Substance and Sexual Activity    Alcohol use: Yes     Comment: every 2-3 weeks 1?2 pint    Drug use: No    Sexual activity: Not on file   Other Topics Concern    Not on file Social History Narrative    Not on file     Social Determinants of Health     Financial Resource Strain:     Difficulty of Paying Living Expenses: Not on file   Food Insecurity:     Worried About Running Out of Food in the Last Year: Not on file    Jayna of Food in the Last Year: Not on file   Transportation Needs:     Lack of Transportation (Medical): Not on file    Lack of Transportation (Non-Medical): Not on file   Physical Activity:     Days of Exercise per Week: Not on file    Minutes of Exercise per Session: Not on file   Stress:     Feeling of Stress : Not on file   Social Connections:     Frequency of Communication with Friends and Family: Not on file    Frequency of Social Gatherings with Friends and Family: Not on file    Attends Muslim Services: Not on file    Active Member of [address] [place] [address] [employer] or Organizations: Not on file    Attends Club or Organization Meetings: Not on file    Marital Status: Not on file   Intimate Partner Violence:     Fear of Current or Ex-Partner: Not on file    Emotionally Abused: Not on file    Physically Abused: Not on file    Sexually Abused: Not on file   Housing Stability:     Unable to Pay for Housing in the Last Year: Not on file    Number of [place] in the Last Year: Not on file    Unstable Housing in the Last Year: Not on file       Current Outpatient Medications   Medication Sig Dispense Refill    DULoxetine (CYMBALTA) 30 mg capsule Take 1 Capsule by mouth two (2) times a day. Take 1 cap by mouth daily for 1 week, then increase to 2 caps daily  Indications: neuropathic pain 60 Capsule 1    cholecalciferol (VITAMIN D3) (1000 Units /25 mcg) tablet Take 1 Tablet by mouth daily.  losartan (COZAAR) 100 mg tablet       pantoprazole (PROTONIX) 40 mg tablet       aspirin delayed-release 81 mg tablet Take 81 mg by mouth daily.  fluticasone-umeclidinium-vilanterol (Trelegy Ellipta) 100-62.5-25 mcg inhaler Take 1 Puff by inhalation daily.  1 Inhaler 0    atorvastatin (LIPITOR) 80 mg tablet Take 1 tablet by mouth daily. 30 tablet 0       No Known Allergies      REVIEW OF SYSTEMS    Constitutional: Negative for fever, chills, or weight change. Respiratory: Negative for cough or shortness of breath. Cardiovascular: Negative for chest pain or palpitations. Gastrointestinal: Negative for acid reflux, change in bowel habits, or constipation. Genitourinary: Negative for dysuria and flank pain. Musculoskeletal: Positive for lumbar pain. Skin: Negative for rash. Neurological: Negative for headaches, dizziness, positive for numbness. Endo/Heme/Allergies: Negative for increased bruising. Psychiatric/Behavioral: Negative for difficulty with sleep. As per HPI    PHYSICAL EXAMINATION  Visit Vitals  /60 (BP 1 Location: Right arm, BP Patient Position: Sitting)   Pulse 68   Temp 97.5 °F (36.4 °C) (Temporal)   Resp 16   Ht 5' 5\" (1.651 m)   Wt 147 lb 3.2 oz (66.8 kg)   SpO2 95%   BMI 24.50 kg/m²       Constitutional: Awake, alert, and in no acute distress. Neurological: Sensation to light touch is intact. Skin: warm, dry, and intact. Musculoskeletal: No pain with extension, axial loading, or forward flexion. No pain with internal or external rotation of his hips. Negative straight leg raise bilaterally. Hip Flex  Quads Hamstrings Ankle DF EHL Ankle PF   Right +4/5 +4/5 +4/5 +4/5 +4/5 +4/5   Left +4/5 +4/5 +4/5 +4/5 +4/5 +4/5     IMAGING:    Lumbar spine MRI from 7/22/2021 was personally reviewed with the patient and demonstrated:  Results from Orders Only encounter on 07/05/21     MRI LUMB SPINE WO CONT     Narrative  EXAM: Lumbar MRI without contrast     CLINICAL INDICATION: neurogenic claudication, increased pain L>R, severe DDD  L5/S1     TECHNIQUE: MRI of the lumbar spine without contrast obtained.  Multiplanar  multisequence MR images of the lumbar spine obtained.     IV Contrast: None     COMPARISON: 10/21/2015     FINDINGS:  All numbering assumes 5 lumbar type vertebrae.     Postoperative Changes: None     Alignment:  There is anterolisthesis of L4 on L5 by 6 mm. .     Osseous structures/Marrow: No evidence of acute fracture. .     The cord terminates at L1.  No cord signal abnormalities appreciated.     Retroperitoneum/Incidental: The abdominal aorta is without evidence of aneurysm. No paraaortic adenopathy appreciated. A likely cyst is noted in the left kidney. It is larger than prior MR measuring 2 x 1.4 cm.     Lower Thoracic Spine: The majority of the lower thoracic spine is only  visualized on the sagittal views.  No significant canal or neural foraminal  stenosis appreciated.     T12-L1: No significant canal or neural foraminal stenosis.     L1-L2 level: Mild broad-based disc bulge and mild facet hypertrophy with mild  ligament flavum buckling. This is unchanged. No significant canal stenosis. Minimal neural foraminal narrowing is noted.     L2-L3: Mild to moderate facet arthropathy with ligamentum flavum buckling is  present. Broad-based disc bulge with bulges extending into the neural foramina  bilaterally and into the extraforaminal spaces. This is relatively similar to  prior imaging. Mild canal narrowing is noted. There is narrowing of the lateral  recesses bilaterally with potential abutment of the descending nerve roots as  they cross the disc space. Mild to moderate bilateral neural foraminal narrowing  is noted. These findings are mildly progressed.     L3-L4: Moderate facet arthropathy with mild ligamentum flavum buckling and mild  epidural fat are noted. Broad-based disc osteophyte formation is noted. Mild  canal narrowing is noted. Mild narrowing of the lateral recesses are noted. No  significant compression of the descending nerve roots appreciated. Moderate  bilateral neural foraminal narrowing is noted. These findings are unchanged.     L4-L5: Anterolisthesis with uncovering of the disc is noted.  Broad-based disc  bulge with facet hypertrophy and ligamentum flavum buckling are present. There  is moderate canal narrowing. Lateral recesses are significantly narrowed with  likely compression of the descending nerve roots. This is progressed. Moderate  bilateral neural foraminal narrowing is noted. There is a low signal focus in  the left lateral recess just below the disc level that extends 8 mm below the  disc level. It is 4 x 6 mm wide. This compresses the descending left L5 nerve  root. This is new since prior imaging.     L5-S1: Broad-based disc bulge is noted. Facet arthropathy is present. No  significant canal stenosis. Moderate neural foraminal narrowing is noted. This  likely abutment of the exiting nerve roots by the extraforaminal disc  bilaterally. These findings are similar to prior imaging.     Impression  1.  New low signal focus in the left lateral recess just below the L4-L5 disc  level compressing the left L5 nerve root. This is favored to be sequestered  disc.     2.  Progressive degenerative changes at L4-L5 with moderate canal narrowing and  moderate bilateral neural foraminal narrowing. Severe lateral recess narrowing  bilaterally compressing the descending L5 nerve roots.     3.  Progressive degenerative changes at L2-L3 with mild to moderate lateral  recess narrowing and potential abutment of the descending nerve roots. Written by Kenny Brooks, as dictated by Divya Ba MD.  I, Dr. Divya Ba confirm that all documentation is accurate.

## 2021-11-29 NOTE — PATIENT INSTRUCTIONS
Herniated Disc: Exercises  Introduction  Here are some examples of exercises for you to try. The exercises may be suggested for a condition or for rehabilitation. Start each exercise slowly. Ease off the exercises if you start to have pain. You will be told when to start these exercises and which ones will work best for you. How to stay safe  These exercises can help you move easier and feel better. But when you first start doing them, you may have more pain in your back. This is normal. But it is important to pay close attention to your pain during and after each exercise. · Keep doing these exercises if your pain stays the same or moves from your leg and buttock more toward the middle of your spine. Pain moving out of your leg and buttock is a good sign. · Stop doing these exercises if your pain gets worse in your leg and buttock. Stop if you start to have pain in your leg and buttock that you didn't have before. Be sure to do these exercises in the order they appear. Note how your pain changes before you move to the next one. If your pain is much worse right after exercise and stays worse the next day, do not do any of these exercises. How to do the exercises  1. Rest on belly    1. Lie on your stomach, with your head turned to the side. 2. Try to relax your lower back muscles as much as you can. 3. Continue to lie on your stomach for 2 minutes. · Keep your arms beside your body. · If that position bothers your neck, place your hands, one on top of the other, underneath your forehead. This will help support your head and neck. If lying in this position causes or increases pain down your leg, stop this exercise and do not do the next exercises. 2. Press-up back extension    1. Lie on your stomach, with your face down and your elbows tucked into your sides and under your shoulders. 2. Press your elbows down into the floor to raise your upper back.   3. Hold this position for 15 to 30 seconds. 4. Repeat 2 to 4 times. · As you do this, relax your stomach muscles and allow your back to arch without using your back muscles. · Let your low back relax completely as you arch up. Don't let your hips or pelvis come off the floor. Then relax, and return to the start position. Over time, work up to staying in the press-up position for up to 2 minutes. If lying in this position causes or increases pain down your leg, stop this exercise and do not do the next exercises. 3. Full press-up back extension    1. Lie on your stomach with your face down, keeping your elbows tucked into your sides and under your shoulders. 2. Straighten your elbows, and push your upper body up as far as you can. 3. Hold this position for 5 seconds, and then relax. 4. Repeat 10 times. Allow your lower back to sag. Keep your hips, pelvis, and legs relaxed. Each time, try to raise your upper body a little higher and hold your arms a bit straighter. If lying in this position causes or increases pain down your leg, stop this exercise and do not do the next exercises. If you can't do this exercise, you may instead try the backward bend exercise that follows. 4. Backward bend    1. Stand with your feet hip-width apart, and don't lock your knees. 2. Place your hands in the small of your back. 3. Bend backward as far as you can, keeping your knees straight. 4. Repeat 2 to 4 times. Your toes should be pointing forward. Hold this position for 2 to 3 seconds. Then return to your starting position. Each time, try to bend backward a little farther, until you bend backward as far as you can. If standing in this position causes or increases pain down your leg, stop this exercise. Follow-up care is a key part of your treatment and safety. Be sure to make and go to all appointments, and call your doctor if you are having problems. It's also a good idea to know your test results and keep a list of the medicines you take.   Where can you learn more? Go to http://www.gray.com/  Enter Z594 in the search box to learn more about \"Herniated Disc: Exercises. \"  Current as of: July 1, 2021               Content Version: 13.0  © 8079-4830 [employer]. Care instructions adapted under license by [employer] (which disclaims liability or warranty for this information). If you have questions about a medical condition or this instruction, always ask your healthcare professional. [place] [address] any warranty or liability for your use of this information.

## 2022-01-10 ENCOUNTER — OFFICE VISIT (OUTPATIENT)
Dept: ORTHOPEDIC SURGERY | Age: 82
End: 2022-01-10
Payer: MEDICARE

## 2022-01-10 VITALS
BODY MASS INDEX: 24.83 KG/M2 | HEART RATE: 61 BPM | WEIGHT: 149 LBS | OXYGEN SATURATION: 98 % | TEMPERATURE: 98.6 F | HEIGHT: 65 IN

## 2022-01-10 DIAGNOSIS — M47.816 LUMBAR FACET ARTHROPATHY: ICD-10-CM

## 2022-01-10 DIAGNOSIS — M48.062 SPINAL STENOSIS OF LUMBAR REGION WITH NEUROGENIC CLAUDICATION: Primary | ICD-10-CM

## 2022-01-10 DIAGNOSIS — M62.838 MUSCLE SPASM: ICD-10-CM

## 2022-01-10 DIAGNOSIS — I73.9 PAD (PERIPHERAL ARTERY DISEASE) (HCC): ICD-10-CM

## 2022-01-10 PROCEDURE — G8432 DEP SCR NOT DOC, RNG: HCPCS | Performed by: PHYSICAL MEDICINE & REHABILITATION

## 2022-01-10 PROCEDURE — G8756 NO BP MEASURE DOC: HCPCS | Performed by: PHYSICAL MEDICINE & REHABILITATION

## 2022-01-10 PROCEDURE — G8536 NO DOC ELDER MAL SCRN: HCPCS | Performed by: PHYSICAL MEDICINE & REHABILITATION

## 2022-01-10 PROCEDURE — 1101F PT FALLS ASSESS-DOCD LE1/YR: CPT | Performed by: PHYSICAL MEDICINE & REHABILITATION

## 2022-01-10 PROCEDURE — 99213 OFFICE O/P EST LOW 20 MIN: CPT | Performed by: PHYSICAL MEDICINE & REHABILITATION

## 2022-01-10 PROCEDURE — G8427 DOCREV CUR MEDS BY ELIG CLIN: HCPCS | Performed by: PHYSICAL MEDICINE & REHABILITATION

## 2022-01-10 PROCEDURE — G8420 CALC BMI NORM PARAMETERS: HCPCS | Performed by: PHYSICAL MEDICINE & REHABILITATION

## 2022-01-10 RX ORDER — CILOSTAZOL 100 MG/1
TABLET ORAL
COMMUNITY
Start: 2021-09-09

## 2022-01-10 RX ORDER — FLUTICASONE PROPIONATE 50 MCG
SPRAY, SUSPENSION (ML) NASAL
COMMUNITY
Start: 2021-04-12

## 2022-01-10 RX ORDER — METHYLPREDNISOLONE 4 MG/1
TABLET ORAL
COMMUNITY
Start: 2021-06-28

## 2022-01-10 RX ORDER — DIAZEPAM 5 MG/1
TABLET ORAL
COMMUNITY
Start: 2020-12-10

## 2022-01-10 RX ORDER — AMLODIPINE BESYLATE 5 MG/1
TABLET ORAL
COMMUNITY
Start: 2020-11-30

## 2022-01-10 RX ORDER — DULOXETIN HYDROCHLORIDE 30 MG/1
30 CAPSULE, DELAYED RELEASE ORAL 2 TIMES DAILY
Qty: 180 CAPSULE | Refills: 1 | Status: SHIPPED | OUTPATIENT
Start: 2022-01-10 | End: 2022-07-11 | Stop reason: SDUPTHER

## 2022-01-10 NOTE — PROGRESS NOTES
[facility]  [address], [facility] [address]  Phone: [phone]  Fax: [phone]    Pt's YOB: 1940    ASSESSMENT   Diagnoses and all orders for this visit:    1. Spinal stenosis of lumbar region with neurogenic claudication  -     DULoxetine (CYMBALTA) 30 mg capsule; Take 1 Capsule by mouth two (2) times a day. Take 1 cap by mouth daily for 1 week, then increase to 2 caps daily  Indications: neuropathic pain    2. Muscle spasm    3. PAD (peripheral artery disease) ([address]    4. Lumbar facet arthropathy         IMPRESSION AND PLAN:  Desmond Feliciano is a 79 y.o. right hand dominant male with history of lumbar pain. He complains of bilateral leg pain with ambulation of greater than 100 yards, alleviated by stopping for a few seconds. Pt is taking Cymbalta 30 mg 1 cap BID with benefit. 1) Pt was given information on lumbar arthritis exercises. 2) He received refill of Cymbalta 30 mg to use bid. 3) Mr. Dinorah Corral has a reminder for a \"due or due soon\" health maintenance. I have asked that he contact his primary care provider, Itzel King MD, for follow-up on this health maintenance. 4)  demonstrated consistency with prescribing. 5) Pt will follow up with vascular as directed  Follow-up and Dispositions    · Return in about 6 months (around 7/10/2022) for Medication follow up. HISTORY OF PRESENT ILLNESS:  Desmond Feliciano is a 79 y.o. right hand dominant male with history of lumbar pain and presents to the office today for follow up. Pt complains of bilateral leg pain with ambulation of greater than 100 yards, alleviated by stopping for a few seconds. Pt is taking Cymbalta 30 mg 1 cap BID with benefit. He reports that he is scheduled to follow up with Dr. Louis Reyes regarding his leg vasculature. Pt reports that he previously had an iliac stent placed with improvement in his ambulation.  He further states that he was scheduled to undergo aortic-iliac bypass surgery recently but the surgery was deferred due to the pandemic. Pt at this time desires to continue with current care. Pain Scale: 0 - No pain/10    PCP: Antonella Bautista MD     Past Medical History:   Diagnosis Date    Adverse effect of anesthesia     slow to wake    Carotid artery disease ([facility]     s/p left CEA 2006 with recurrent stenosis treated with stenting May 2011    Cataract     COPD (chronic obstructive pulmonary disease) ([facility]     emphysema    DJD (degenerative joint disease)     Es[facility] ([facility]     Polyps removed    GERD (gastroesophageal reflux disease)     History of tobacco use     HTN (hypertension)     Hyperlipidemia     Normal nuclear stress test 05/27/2011    No ischemia or prior infarction. EF 64%. Neg EKG on max EST.   Ex time 5:15.    Other ill-defined conditions(799.89)     stent in carotid artery-left    PAD (peripheral artery disease) ([facility]     followed by Dr Cas Salazar, apparently abnormal Doppler testing of LLE    Stroke 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        Social History     Socioeconomic History    Marital status:      Spouse name: Not on file    Number of children: Not on file    Years of education: Not on file    Highest education level: Not on file   Occupational History    Not on file   Tobacco Use    Smoking status: Former Smoker     Years: 50.00     Quit date: 4/5/2011     Years since quitting: 10.7    Smokeless tobacco: Never Used   Substance and Sexual Activity    Alcohol use: Yes     Comment: every 2-3 weeks 1?2 pint    Drug use: No    Sexual activity: Not on file   Other Topics Concern    Not on file   Social History Narrative    Not on file     Social Determinants of Health     Financial Resource Strain:     Difficulty of Paying Living Expenses: Not on file   Food Insecurity:     Worried About [address] in the Last Year: Not on file    Jayna almanza Food in the Last Year: Not on file   Transportation Needs:     Lack of Transportation (Medical): Not on file    Lack of Transportation (Non-Medical): Not on file   Physical Activity:     Days of Exercise per Week: Not on file    Minutes of Exercise per Session: Not on file   Stress:     Feeling of Stress : Not on file   Social Connections:     Frequency of Communication with Friends and Family: Not on file    Frequency of Social Gatherings with Friends and Family: Not on file    Attends [employer] Services: Not on file    Active Member of [address] [place] [address] POI or Organizations: Not on file    Attends Club or Organization Meetings: Not on file    Marital Status: Not on file   Intimate Partner Violence:     Fear of Current or Ex-Partner: Not on file    Emotionally Abused: Not on file    Physically Abused: Not on file    Sexually Abused: Not on file   Housing Stability:     Unable to Pay for Housing in the Last Year: Not on file    Number of [place] in the Last Year: Not on file    Unstable Housing in the Last Year: Not on file       Current Outpatient Medications   Medication Sig Dispense Refill    DULoxetine (CYMBALTA) 30 mg capsule Take 1 Capsule by mouth two (2) times a day. Take 1 cap by mouth daily for 1 week, then increase to 2 caps daily  Indications: neuropathic pain 180 Capsule 1    cholecalciferol (VITAMIN D3) (1000 Units /25 mcg) tablet Take 1 Tablet by mouth daily.  losartan (COZAAR) 100 mg tablet       pantoprazole (PROTONIX) 40 mg tablet       aspirin delayed-release 81 mg tablet Take 81 mg by mouth daily.  fluticasone-umeclidinium-vilanterol (Trelegy Ellipta) 100-62.5-25 mcg inhaler Take 1 Puff by inhalation daily. 1 Inhaler 0    atorvastatin (LIPITOR) 80 mg tablet Take 1 tablet by mouth daily.  30 tablet 0    amLODIPine (NORVASC) 5 mg tablet  (Patient not taking: Reported on 1/10/2022)      cilostazoL (PLETAL) 100 mg tablet  (Patient not taking: Reported on 1/10/2022)      diazePAM (VALIUM) 5 mg tablet  (Patient not taking: Reported on 1/10/2022)      fluticasone propionate (FLONASE) 50 mcg/actuation nasal spray  (Patient not taking: Reported on 1/10/2022)      methylPREDNISolone (MEDROL DOSEPACK) 4 mg tablet  (Patient not taking: Reported on 1/10/2022)         No Known Allergies      REVIEW OF SYSTEMS    Constitutional: Negative for fever, chills, or weight change. Respiratory: Negative for cough or shortness of breath. Cardiovascular: Negative for chest pain or palpitations. Gastrointestinal: Negative for acid reflux, change in bowel habits, or constipation. Genitourinary: Negative for dysuria and flank pain. Musculoskeletal: Positive for lumbar pain. Skin: Negative for rash. Neurological: Negative for headaches, dizziness, or numbness. Endo/Heme/Allergies: Negative for increased bruising. Psychiatric/Behavioral: Negative for difficulty with sleep. As per HPI    PHYSICAL EXAMINATION  Visit Vitals  Pulse 61   Temp 98.6 °F (37 °C) (Temporal)   Ht 5' 5\" (1.651 m)   Wt 149 lb (67.6 kg)   SpO2 98%   BMI 24.79 kg/m²       Constitutional: Awake, alert, and in no acute distress. Neurological: Sensation to light touch is intact. Negative Blake's sign bilaterally. Skin: warm, dry, and intact. Musculoskeletal: No pain with extension, axial loading, or forward flexion. No pain with internal or external rotation of his hips. Negative straight leg raise bilaterally. Hip Flex  Quads Hamstrings Ankle DF EHL Ankle PF   Right +4/5 +4/5 +4/5 +4/5 +4/5 +4/5   Left +4/5 +4/5 +4/5 +4/5 +4/5 +4/5     IMAGING:    Lumbar spine MRI from 7/22/2021 was personally reviewed with the patient and demonstrated:  Results from Orders Only encounter on 07/05/21     MRI LUMB SPINE WO CONT     Narrative  EXAM: Lumbar MRI without contrast     CLINICAL INDICATION: neurogenic claudication, increased pain L>R, severe DDD  L5/S1     TECHNIQUE: MRI of the lumbar spine without contrast obtained. Multiplanar  multisequence MR images of the lumbar spine obtained.     IV Contrast: None     COMPARISON: 10/21/2015     FINDINGS:  All numbering assumes 5 lumbar type vertebrae.     Postoperative Changes: None     Alignment:  There is anterolisthesis of L4 on L5 by 6 mm. .     Osseous structures/Marrow: No evidence of acute fracture. .     The cord terminates at L1.  No cord signal abnormalities appreciated.     Retroperitoneum/Incidental: The abdominal aorta is without evidence of aneurysm. No paraaortic adenopathy appreciated. A likely cyst is noted in the left kidney. It is larger than prior MR measuring 2 x 1.4 cm.     Lower Thoracic Spine: The majority of the lower thoracic spine is only  visualized on the sagittal views.  No significant canal or neural foraminal  stenosis appreciated.     T12-L1: No significant canal or neural foraminal stenosis.     L1-L2 level: Mild broad-based disc bulge and mild facet hypertrophy with mild  ligament flavum buckling. This is unchanged. No significant canal stenosis. Minimal neural foraminal narrowing is noted.     L2-L3: Mild to moderate facet arthropathy with ligamentum flavum buckling is  present. Broad-based disc bulge with bulges extending into the neural foramina  bilaterally and into the extraforaminal spaces. This is relatively similar to  prior imaging. Mild canal narrowing is noted. There is narrowing of the lateral  recesses bilaterally with potential abutment of the descending nerve roots as  they cross the disc space. Mild to moderate bilateral neural foraminal narrowing  is noted. These findings are mildly progressed.     L3-L4: Moderate facet arthropathy with mild ligamentum flavum buckling and mild  epidural fat are noted. Broad-based disc osteophyte formation is noted. Mild  canal narrowing is noted. Mild narrowing of the lateral recesses are noted. No  significant compression of the descending nerve roots appreciated.  Moderate  bilateral neural foraminal narrowing is noted. These findings are unchanged.     L4-L5: Anterolisthesis with uncovering of the disc is noted. Broad-based disc  bulge with facet hypertrophy and ligamentum flavum buckling are present. There  is moderate canal narrowing. Lateral recesses are significantly narrowed with  likely compression of the descending nerve roots. This is progressed. Moderate  bilateral neural foraminal narrowing is noted. There is a low signal focus in  the left lateral recess just below the disc level that extends 8 mm below the  disc level. It is 4 x 6 mm wide. This compresses the descending left L5 nerve  root. This is new since prior imaging.     L5-S1: Broad-based disc bulge is noted. Facet arthropathy is present. No  significant canal stenosis. Moderate neural foraminal narrowing is noted. This  likely abutment of the exiting nerve roots by the extraforaminal disc  bilaterally. These findings are similar to prior imaging.     Impression  1.  New low signal focus in the left lateral recess just below the L4-L5 disc  level compressing the left L5 nerve root. This is favored to be sequestered  disc.     2.  Progressive degenerative changes at L4-L5 with moderate canal narrowing and  moderate bilateral neural foraminal narrowing. Severe lateral recess narrowing  bilaterally compressing the descending L5 nerve roots.     3.  Progressive degenerative changes at L2-L3 with mild to moderate lateral  recess narrowing and potential abutment of the descending nerve roots. Written by Mani Alarcon, as dictated by Herb Garcia MD.  I, Dr. Herb Garcia confirm that all documentation is accurate.

## 2022-01-10 NOTE — PROGRESS NOTES
Carolin Rene presents today for   Chief Complaint   Patient presents with    Back Pain       Is someone accompanying this pt? no    Is the patient using any DME equipment during OV? no    Depression Screening:  3 most recent PHQ Screens 8/3/2021   Little interest or pleasure in doing things Not at all   Feeling down, depressed, irritable, or hopeless Not at all   Total Score PHQ 2 0       Learning Assessment:  Learning Assessment 2/1/2017   PRIMARY LEARNER Patient   PRIMARY LANGUAGE ENGLISH   LEARNER PREFERENCE PRIMARY LISTENING   ANSWERED BY patient   RELATIONSHIP SELF       Abuse Screening:  No flowsheet data found. Fall Risk  Fall Risk Assessment, last 12 mths 3/14/2018   Able to walk? Yes   Fall in past 12 months? No       OPIOID RISK TOOL  No flowsheet data found. Coordination of Care:  1. Have you been to the ER, urgent care clinic since your last visit? no  Hospitalized since your last visit? no    2. Have you seen or consulted any other health care providers outside of the [address] [place] [address] since your last visit? no Include any pap smears or colon screening.  no

## 2022-01-10 NOTE — PATIENT INSTRUCTIONS
Low Back Arthritis: Exercises  Introduction  Here are some examples of typical rehabilitation exercises for your condition. Start each exercise slowly. Ease off the exercise if you start to have pain. Your doctor or physical therapist will tell you when you can start these exercises and which ones will work best for you. When you are not being active, find a comfortable position for rest. Some people are comfortable on the floor or a medium-firm bed with a small pillow under their head and another under their knees. Some people prefer to lie on their side with a pillow between their knees. Don't stay in one position for too long. Take short walks (10 to 20 minutes) every 2 to 3 hours. Avoid slopes, hills, and stairs until you feel better. Walk only distances you can manage without pain, especially leg pain. How to do the exercises  Pelvic tilt    1. Lie on your back with your knees bent. 2. \"Brace\" your stomach--tighten your muscles by pulling in and imagining your belly button moving toward your spine. 3. Press your lower back into the floor. You should feel your hips and pelvis rock back. 4. Hold for 6 seconds while breathing smoothly. 5. Relax and allow your pelvis and hips to rock forward. 6. Repeat 8 to 12 times. Back stretches    1. Get down on your hands and knees on the floor. 2. Relax your head and allow it to droop. Round your back up toward the ceiling until you feel a nice stretch in your upper, middle, and lower back. Hold this stretch for as long as it feels comfortable, or about 15 to 30 seconds. 3. Return to the starting position with a flat back while you are on your hands and knees. 4. Let your back sway by pressing your stomach toward the floor. Lift your buttocks toward the ceiling. 5. Hold this position for 15 to 30 seconds. 6. Repeat 2 to 4 times. Follow-up care is a key part of your treatment and safety.  Be sure to make and go to all appointments, and call your doctor if you are having problems. It's also a good idea to know your test results and keep a list of the medicines you take. Where can you learn more? Go to http://www.[employer] [facility].com/  Enter T094 in the search box to learn more about \"Low Back Arthritis: Exercises. \"  Current as of: July 1, 2021               Content Version: 13.0  © 0900-4510 [employer]. Care instructions adapted under license by [employer] (which disclaims liability or warranty for this information). If you have questions about a medical condition or this instruction, always ask your healthcare professional. [place] [address] any warranty or liability for your use of this information.

## 2022-06-27 ENCOUNTER — TELEPHONE (OUTPATIENT)
Dept: ORTHOPEDIC SURGERY | Age: 82
End: 2022-06-27

## 2022-07-11 DIAGNOSIS — M48.062 SPINAL STENOSIS OF LUMBAR REGION WITH NEUROGENIC CLAUDICATION: ICD-10-CM

## 2022-07-11 RX ORDER — DULOXETIN HYDROCHLORIDE 30 MG/1
30 CAPSULE, DELAYED RELEASE ORAL 2 TIMES DAILY
Qty: 180 CAPSULE | Refills: 0 | Status: SHIPPED | OUTPATIENT
Start: 2022-07-11 | End: 2022-08-24 | Stop reason: SDUPTHER

## 2022-07-11 NOTE — TELEPHONE ENCOUNTER
Pt was last seen on 01/10/22 and given a 90 days supply with one refill. He missed his June follow up appt and is now scheduled for 08/24/22. Please review.

## 2022-07-28 ENCOUNTER — TELEPHONE (OUTPATIENT)
Dept: ORTHOPEDIC SURGERY | Age: 82
End: 2022-07-28

## 2022-08-02 ENCOUNTER — TELEPHONE (OUTPATIENT)
Dept: ORTHOPEDIC SURGERY | Age: 82
End: 2022-08-02

## 2022-08-24 ENCOUNTER — OFFICE VISIT (OUTPATIENT)
Dept: ORTHOPEDIC SURGERY | Age: 82
End: 2022-08-24
Payer: MEDICARE

## 2022-08-24 VITALS
HEIGHT: 65 IN | TEMPERATURE: 98.2 F | WEIGHT: 149 LBS | BODY MASS INDEX: 24.83 KG/M2 | RESPIRATION RATE: 14 BRPM | OXYGEN SATURATION: 95 % | HEART RATE: 64 BPM

## 2022-08-24 DIAGNOSIS — M62.838 MUSCLE SPASM: ICD-10-CM

## 2022-08-24 DIAGNOSIS — G95.19 NEUROGENIC CLAUDICATION (HCC): ICD-10-CM

## 2022-08-24 DIAGNOSIS — M47.816 LUMBAR FACET ARTHROPATHY: ICD-10-CM

## 2022-08-24 DIAGNOSIS — I73.9 PAD (PERIPHERAL ARTERY DISEASE) (HCC): ICD-10-CM

## 2022-08-24 DIAGNOSIS — M48.062 SPINAL STENOSIS OF LUMBAR REGION WITH NEUROGENIC CLAUDICATION: Primary | ICD-10-CM

## 2022-08-24 PROCEDURE — 99213 OFFICE O/P EST LOW 20 MIN: CPT | Performed by: PHYSICAL MEDICINE & REHABILITATION

## 2022-08-24 PROCEDURE — G8536 NO DOC ELDER MAL SCRN: HCPCS | Performed by: PHYSICAL MEDICINE & REHABILITATION

## 2022-08-24 PROCEDURE — G8420 CALC BMI NORM PARAMETERS: HCPCS | Performed by: PHYSICAL MEDICINE & REHABILITATION

## 2022-08-24 PROCEDURE — 1123F ACP DISCUSS/DSCN MKR DOCD: CPT | Performed by: PHYSICAL MEDICINE & REHABILITATION

## 2022-08-24 PROCEDURE — G8756 NO BP MEASURE DOC: HCPCS | Performed by: PHYSICAL MEDICINE & REHABILITATION

## 2022-08-24 PROCEDURE — G8432 DEP SCR NOT DOC, RNG: HCPCS | Performed by: PHYSICAL MEDICINE & REHABILITATION

## 2022-08-24 PROCEDURE — 1101F PT FALLS ASSESS-DOCD LE1/YR: CPT | Performed by: PHYSICAL MEDICINE & REHABILITATION

## 2022-08-24 PROCEDURE — G8427 DOCREV CUR MEDS BY ELIG CLIN: HCPCS | Performed by: PHYSICAL MEDICINE & REHABILITATION

## 2022-08-24 RX ORDER — METHYLPREDNISOLONE 4 MG/1
TABLET ORAL
Qty: 1 DOSE PACK | Refills: 0 | Status: SHIPPED | OUTPATIENT
Start: 2022-08-24

## 2022-08-24 RX ORDER — DULOXETIN HYDROCHLORIDE 30 MG/1
30 CAPSULE, DELAYED RELEASE ORAL 2 TIMES DAILY
Qty: 180 CAPSULE | Refills: 1 | Status: SHIPPED | OUTPATIENT
Start: 2022-10-07

## 2022-09-12 NOTE — PROGRESS NOTES
[facility]  [address], [facility] [address]  Phone: [phone]  Fax: [phone]    Pt's YOB: 1940    ASSESSMENT   Diagnoses and all orders for this visit:    1. Spinal stenosis of lumbar region with neurogenic claudication  -     DULoxetine (CYMBALTA) 30 mg capsule; Take 1 Capsule by mouth two (2) times a day. Take 1 cap by mouth daily for 1 week, then increase to 2 caps daily  Indications: neuropathic pain  -     methylPREDNISolone (MEDROL DOSEPACK) 4 mg tablet; Per dose pack instructions    2. Muscle spasm    3. Lumbar facet arthropathy  -     methylPREDNISolone (MEDROL DOSEPACK) 4 mg tablet; Per dose pack instructions    4. PAD (peripheral artery disease) ([address]    5. Neurogenic claudication ([address]       IMPRESSION AND PLAN:  Pt is 78 yo male with hx PAD and chronic musculoskeletal pain continues to have inflammatory pain worse with activity; he has been on chronic anticoagulation due to PAD     1) Pt was given information on low back arthritis and sacroiliac joint  exercises. 2) Refill for Cymbalta 30 mg bid was given  3) MDP was given for acute flare of pain  4) Mr. Eloy Pemberton has a reminder for a \"due or due soon\" health maintenance. I have asked that he contact his primary care provider, Kristan Pradhan MD, for follow-up on this health maintenance. 5)  demonstrated consistency with prescribing. 6) Pt encouraged to take Vit D3 regularly   Follow-up and Dispositions    Return in about 6 months (around 2/24/2023) for Medication follow up. HISTORY OF PRESENT ILLNESS:  Lamine Maza is a 79 y.o.  male with history of lumbar pain and presents to the office today for medication follow up. Pt reports his pain as 2/10 and describes it as intermittent, aching, and dull. He remains on Cymbalta 30 mg bid and this helps with chronic musculoskeletal pain.   He states he has some pain in the am but notes with increased activity, he has more pain. He has not found that weather changes have any effect that on his pain. He has found that walking on grass / sand is hard and walking on a firm surface is better. He states his pain feels like a toothache. He also has seen Dr Emani Escalante 1 month ago and had vascular evaluation. He did have some blockage in a stent. In the past he has used Lyrica but was not able to tolerate this medication due to sedation. His most recent Vit D level on 10/2021 was 32.1       He desires to continue with current medications and refills for Cymbalta 30 mg bid and MDP. Pain Scale: 2/10    PCP: Román Copeland MD     Past Medical History:   Diagnosis Date    Adverse effect of anesthesia     slow to wake    Carotid artery disease ([facility]     s/p left CEA  with recurrent stenosis treated with stenting May 2011    Cataract     COPD (chronic obstructive pulmonary disease) (HCC)     emphysema    DJD (degenerative joint disease)     Esophageal cancer ([facility]     Polyps removed    GERD (gastroesophageal reflux disease)     History of tobacco use     HTN (hypertension)     Hyperlipidemia     Normal nuclear stress test 2011    No ischemia or prior infarction. EF 64%. Neg EKG on max EST. Ex time 5:15.     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-        Social History     Socioeconomic History    Marital status:      Spouse name: Not on file    Number of children: Not on file    Years of education: Not on file    Highest education level: Not on file   Occupational History    Not on file   Tobacco Use    Smoking status: Former     Years: 50.00     Types: Cigarettes     Quit date: 2011     Years since quittin.4    Smokeless tobacco: Never   Vaping Use    Vaping Use: Never used Substance and Sexual Activity    Alcohol use: Yes     Comment: every 2-3 weeks 1?2 pint    Drug use: No    Sexual activity: Not on file   Other Topics Concern    Not on file   Social History Narrative    Not on file     Social Determinants of Health     Financial Resource Strain: Not on file   Food Insecurity: Not on file   Transportation Needs: Not on file   Physical Activity: Not on file   Stress: Not on file   Social Connections: Not on file   Intimate Partner Violence: Not on file   Housing Stability: Not on file       Current Outpatient Medications   Medication Sig Dispense Refill    [START ON 10/7/2022] DULoxetine (CYMBALTA) 30 mg capsule Take 1 Capsule by mouth two (2) times a day. Take 1 cap by mouth daily for 1 week, then increase to 2 caps daily  Indications: neuropathic pain 180 Capsule 1    methylPREDNISolone (MEDROL DOSEPACK) 4 mg tablet Per dose pack instructions 1 Dose Pack 0    cholecalciferol (VITAMIN D3) (1000 Units /25 mcg) tablet Take 1 Tablet by mouth daily. losartan (COZAAR) 100 mg tablet       pantoprazole (PROTONIX) 40 mg tablet       aspirin delayed-release 81 mg tablet Take 81 mg by mouth daily. fluticasone-umeclidinium-vilanterol (Trelegy Ellipta) 100-62.5-25 mcg inhaler Take 1 Puff by inhalation daily. 1 Inhaler 0    atorvastatin (LIPITOR) 80 mg tablet Take 1 tablet by mouth daily. 30 tablet 0    amLODIPine (NORVASC) 5 mg tablet  (Patient not taking: No sig reported)      cilostazoL (PLETAL) 100 mg tablet  (Patient not taking: No sig reported)      diazePAM (VALIUM) 5 mg tablet  (Patient not taking: No sig reported)      fluticasone propionate (FLONASE) 50 mcg/actuation nasal spray  (Patient not taking: No sig reported)      methylPREDNISolone (MEDROL DOSEPACK) 4 mg tablet  (Patient not taking: No sig reported)         No Known Allergies      REVIEW OF SYSTEMS    Constitutional: Negative for fever, chills, or weight change.    Respiratory: Negative for cough or shortness of breath. Cardiovascular: Negative for chest pain or palpitations. Gastrointestinal: Negative for acid reflux, change in bowel habits, or constipation. Genitourinary: Negative for dysuria and flank pain. Musculoskeletal: Positive for lumbar pain. Skin: Negative for rash. Neurological: Negative for headaches, dizziness, or numbness. Endo/Heme/Allergies: Negative for increased bruising. Psychiatric/Behavioral: Negative for difficulty with sleep. As per HPI    PHYSICAL EXAMINATION  Visit Vitals  Pulse 64   Temp 98.2 °F (36.8 °C) (Temporal)   Resp 14   Ht 5' 5\" (1.651 m)   Wt 149 lb (67.6 kg)   SpO2 95%   BMI 24.79 kg/m²       Constitutional: Awake, alert, and in no acute distress. Neurological: 1+ symmetrical DTRs in the upper extremities. 1+ symmetrical DTRs in the lower extremities. Sensation to light touch is intact. Negative Blake's sign bilaterally. Skin: warm, dry, and intact. Musculoskeletal:  Mild pain with extension, axial loading, or forward flexion. No pain with internal or external rotation of his hips. Negative straight leg raise bilaterally. Biceps  Triceps Deltoids Wrist Ext Wrist Flex Hand Intrin   Right +4/5 +4/5 +4/5 +4/5 +4/5 +4/5   Left +4/5 +4/5 +4/5 +4/5 +4/5 +4/5      Hip Flex  Quads Hamstrings Ankle DF EHL Ankle PF   Right +4/5 +4/5 +4/5 +4/5 +4/5 +4/5   Left +4/5 +4/5 +4/5 +4/5 +4/5 +4/5     IMAGING:       Lumbar spine MRI from 7/22/2021 was personally reviewed with the patient and demonstrated:  Results from Orders Only encounter on 07/05/21     MRI LUMB SPINE WO CONT     Narrative  EXAM: Lumbar MRI without contrast     CLINICAL INDICATION: neurogenic claudication, increased pain L>R, severe DDD  L5/S1     TECHNIQUE: MRI of the lumbar spine without contrast obtained. Multiplanar  multisequence MR images of the lumbar spine obtained. IV Contrast: None     COMPARISON: 10/21/2015     FINDINGS:  All numbering assumes 5 lumbar type vertebrae.      Postoperative Changes: None     Alignment:  There is anterolisthesis of L4 on L5 by 6 mm. .     Osseous structures/Marrow: No evidence of acute fracture. .     The cord terminates at L1. No cord signal abnormalities appreciated. Retroperitoneum/Incidental: The abdominal aorta is without evidence of aneurysm. No paraaortic adenopathy appreciated. A likely cyst is noted in the left kidney. It is larger than prior MR measuring 2 x 1.4 cm. Lower Thoracic Spine: The majority of the lower thoracic spine is only  visualized on the sagittal views. No significant canal or neural foraminal  stenosis appreciated. T12-L1: No significant canal or neural foraminal stenosis. L1-L2 level: Mild broad-based disc bulge and mild facet hypertrophy with mild  ligament flavum buckling. This is unchanged. No significant canal stenosis. Minimal neural foraminal narrowing is noted. L2-L3: Mild to moderate facet arthropathy with ligamentum flavum buckling is  present. Broad-based disc bulge with bulges extending into the neural foramina  bilaterally and into the extraforaminal spaces. This is relatively similar to  prior imaging. Mild canal narrowing is noted. There is narrowing of the lateral  recesses bilaterally with potential abutment of the descending nerve roots as  they cross the disc space. Mild to moderate bilateral neural foraminal narrowing  is noted. These findings are mildly progressed. L3-L4: Moderate facet arthropathy with mild ligamentum flavum buckling and mild  epidural fat are noted. Broad-based disc osteophyte formation is noted. Mild  canal narrowing is noted. Mild narrowing of the lateral recesses are noted. No  significant compression of the descending nerve roots appreciated. Moderate  bilateral neural foraminal narrowing is noted. These findings are unchanged. L4-L5: Anterolisthesis with uncovering of the disc is noted. Broad-based disc  bulge with facet hypertrophy and ligamentum flavum buckling are present. There  is moderate canal narrowing. Lateral recesses are significantly narrowed with  likely compression of the descending nerve roots. This is progressed. Moderate  bilateral neural foraminal narrowing is noted. There is a low signal focus in  the left lateral recess just below the disc level that extends 8 mm below the  disc level. It is 4 x 6 mm wide. This compresses the descending left L5 nerve  root. This is new since prior imaging. L5-S1: Broad-based disc bulge is noted. Facet arthropathy is present. No  significant canal stenosis. Moderate neural foraminal narrowing is noted. This  likely abutment of the exiting nerve roots by the extraforaminal disc  bilaterally. These findings are similar to prior imaging. Impression  1. New low signal focus in the left lateral recess just below the L4-L5 disc  level compressing the left L5 nerve root. This is favored to be sequestered  disc. 2.  Progressive degenerative changes at L4-L5 with moderate canal narrowing and  moderate bilateral neural foraminal narrowing. Severe lateral recess narrowing  bilaterally compressing the descending L5 nerve roots. 3.  Progressive degenerative changes at L2-L3 with mild to moderate lateral  recess narrowing and potential abutment of the descending nerve roots.

## 2024-07-15 NOTE — TELEPHONE ENCOUNTER
Drug is covered by current benefit plan.  No further PA activity needed duloxetine Plan: Pt already has clobetasol Detail Level: Zone

## 2024-09-25 NOTE — PERIOP NOTE
Instructions for your surgery at [facility]      Today's Date:  9/25/2024      Patient's Name:  Loco Hutson           Surgery Date:  9/30/2024              Please enter the main entrance of the hospital and check-in at the front security desk located in the lobby. They will direct you to the area to report for your surgery.     Do NOT eat or drink anything, including candy, gum, or ice chips after midnight prior to your surgery, unless you have specific instructions from your surgeon or anesthesia provider to do so.  Brush your teeth before coming to the hospital. You may swish with water, but do not swallow.  No smoking/Vaping/E-Cigarettes 24 hours prior to the day of surgery.  No alcohol 24 hours prior to the day of surgery.  No recreational drugs for one week prior to the day of surgery.  Bring Photo ID, Insurance information, and Co-pay if required on day of surgery.  Bring in pertinent legal documents, such as, Medical Power of , DNR, Advance Directive, etc.  Leave all valuables, including money/purse, at home.  Remove all jewelry, including ALL body piercings, nail polish, acrylic nails, and makeup (including mascara); no lotions, powders, deodorant, or perfume/cologne/after shave on the skin.  Follow instruction for Hibiclens washes and CHG wipes from surgeon's office.   Glasses and dentures may be worn to the hospital. They must be removed prior to surgery. Please bring case/container for glasses or dentures.   Contact lenses should not be worn on day of surgery.   Call your doctor's office if symptoms of a cold or illness develop within 24-48 hours prior to your surgery.  Call your doctor's office if you have any questions concerning insurance or co-pays.  15. AN ADULT (relative or friend 18 years or older) MUST DRIVE YOU HOME AFTER YOUR SURGERY.  16. Please make arrangements for a responsible adult (18 years or older) to be with you for 24 hours after your surgery.

## 2024-09-29 ENCOUNTER — ANESTHESIA EVENT (OUTPATIENT)
Facility: HOSPITAL | Age: 84
End: 2024-09-29
Payer: MEDICARE

## 2024-09-30 ENCOUNTER — ANESTHESIA (OUTPATIENT)
Facility: HOSPITAL | Age: 84
End: 2024-09-30
Payer: MEDICARE

## 2024-09-30 ENCOUNTER — HOSPITAL ENCOUNTER (OUTPATIENT)
Facility: HOSPITAL | Age: 84
Setting detail: OUTPATIENT SURGERY
Discharge: HOME | End: 2024-09-30
Attending: PLASTIC SURGERY | Admitting: PLASTIC SURGERY
Payer: MEDICARE

## 2024-09-30 VITALS
BODY MASS INDEX: 21.86 KG/M2 | OXYGEN SATURATION: 97 % | HEART RATE: 61 BPM | HEIGHT: 66 IN | TEMPERATURE: 98.2 F | SYSTOLIC BLOOD PRESSURE: 106 MMHG | RESPIRATION RATE: 18 BRPM | DIASTOLIC BLOOD PRESSURE: 63 MMHG | WEIGHT: 136 LBS

## 2024-09-30 PROCEDURE — 7100000000 HC PACU RECOVERY - FIRST 15 MIN: Performed by: PLASTIC SURGERY

## 2024-09-30 PROCEDURE — 7100000001 HC PACU RECOVERY - ADDTL 15 MIN: Performed by: PLASTIC SURGERY

## 2024-09-30 PROCEDURE — 2709999900 HC NON-CHARGEABLE SUPPLY: Performed by: PLASTIC SURGERY

## 2024-09-30 PROCEDURE — 7100000010 HC PHASE II RECOVERY - FIRST 15 MIN: Performed by: PLASTIC SURGERY

## 2024-09-30 PROCEDURE — 6370000000 HC RX 637 (ALT 250 FOR IP): Performed by: NURSE ANESTHETIST, CERTIFIED REGISTERED

## 2024-09-30 PROCEDURE — 2500000003 HC RX 250 WO HCPCS: Performed by: NURSE ANESTHETIST, CERTIFIED REGISTERED

## 2024-09-30 PROCEDURE — 3700000000 HC ANESTHESIA ATTENDED CARE: Performed by: PLASTIC SURGERY

## 2024-09-30 PROCEDURE — 2580000003 HC RX 258: Performed by: NURSE ANESTHETIST, CERTIFIED REGISTERED

## 2024-09-30 PROCEDURE — 6360000002 HC RX W HCPCS: Performed by: NURSE ANESTHETIST, CERTIFIED REGISTERED

## 2024-09-30 PROCEDURE — 2580000003 HC RX 258: Performed by: PLASTIC SURGERY

## 2024-09-30 PROCEDURE — 3600000002 HC SURGERY LEVEL 2 BASE: Performed by: PLASTIC SURGERY

## 2024-09-30 PROCEDURE — 3600000012 HC SURGERY LEVEL 2 ADDTL 15MIN: Performed by: PLASTIC SURGERY

## 2024-09-30 PROCEDURE — 7100000011 HC PHASE II RECOVERY - ADDTL 15 MIN: Performed by: PLASTIC SURGERY

## 2024-09-30 PROCEDURE — 2500000003 HC RX 250 WO HCPCS: Performed by: PLASTIC SURGERY

## 2024-09-30 PROCEDURE — 3700000001 HC ADD 15 MINUTES (ANESTHESIA): Performed by: PLASTIC SURGERY

## 2024-09-30 PROCEDURE — A4217 STERILE WATER/SALINE, 500 ML: HCPCS | Performed by: PLASTIC SURGERY

## 2024-09-30 RX ORDER — ONDANSETRON 2 MG/ML
4 INJECTION INTRAMUSCULAR; INTRAVENOUS
Status: DISCONTINUED | OUTPATIENT
Start: 2024-09-30 | End: 2024-09-30 | Stop reason: HOSPADM

## 2024-09-30 RX ORDER — SODIUM CHLORIDE 0.9 % (FLUSH) 0.9 %
5-40 SYRINGE (ML) INJECTION EVERY 12 HOURS SCHEDULED
Status: DISCONTINUED | OUTPATIENT
Start: 2024-09-30 | End: 2024-09-30 | Stop reason: HOSPADM

## 2024-09-30 RX ORDER — FAMOTIDINE 20 MG/1
20 TABLET, FILM COATED ORAL ONCE
Status: COMPLETED | OUTPATIENT
Start: 2024-09-30 | End: 2024-09-30

## 2024-09-30 RX ORDER — SODIUM CHLORIDE 9 MG/ML
INJECTION, SOLUTION INTRAVENOUS PRN
Status: DISCONTINUED | OUTPATIENT
Start: 2024-09-30 | End: 2024-09-30 | Stop reason: HOSPADM

## 2024-09-30 RX ORDER — SODIUM CHLORIDE 0.9 % (FLUSH) 0.9 %
5-40 SYRINGE (ML) INJECTION PRN
Status: DISCONTINUED | OUTPATIENT
Start: 2024-09-30 | End: 2024-09-30 | Stop reason: HOSPADM

## 2024-09-30 RX ORDER — FENTANYL CITRATE 50 UG/ML
INJECTION, SOLUTION INTRAMUSCULAR; INTRAVENOUS
Status: DISCONTINUED | OUTPATIENT
Start: 2024-09-30 | End: 2024-09-30 | Stop reason: SDUPTHER

## 2024-09-30 RX ORDER — SUCCINYLCHOLINE/SOD CL,ISO/PF 100 MG/5ML
SYRINGE (ML) INTRAVENOUS
Status: DISCONTINUED | OUTPATIENT
Start: 2024-09-30 | End: 2024-09-30 | Stop reason: SDUPTHER

## 2024-09-30 RX ORDER — DEXAMETHASONE SODIUM PHOSPHATE 4 MG/ML
INJECTION, SOLUTION INTRA-ARTICULAR; INTRALESIONAL; INTRAMUSCULAR; INTRAVENOUS; SOFT TISSUE
Status: DISCONTINUED | OUTPATIENT
Start: 2024-09-30 | End: 2024-09-30 | Stop reason: SDUPTHER

## 2024-09-30 RX ORDER — GLUCAGON 1 MG
1 KIT INJECTION PRN
Status: DISCONTINUED | OUTPATIENT
Start: 2024-09-30 | End: 2024-09-30 | Stop reason: HOSPADM

## 2024-09-30 RX ORDER — CEFAZOLIN SODIUM 1 G/3ML
INJECTION, POWDER, FOR SOLUTION INTRAMUSCULAR; INTRAVENOUS
Status: DISCONTINUED | OUTPATIENT
Start: 2024-09-30 | End: 2024-09-30 | Stop reason: SDUPTHER

## 2024-09-30 RX ORDER — PROPOFOL 10 MG/ML
INJECTION, EMULSION INTRAVENOUS
Status: DISCONTINUED | OUTPATIENT
Start: 2024-09-30 | End: 2024-09-30 | Stop reason: SDUPTHER

## 2024-09-30 RX ORDER — MAGNESIUM HYDROXIDE 1200 MG/15ML
LIQUID ORAL CONTINUOUS PRN
Status: COMPLETED | OUTPATIENT
Start: 2024-09-30 | End: 2024-09-30

## 2024-09-30 RX ORDER — LIDOCAINE HYDROCHLORIDE AND EPINEPHRINE BITARTRATE 20; .01 MG/ML; MG/ML
INJECTION, SOLUTION SUBCUTANEOUS PRN
Status: DISCONTINUED | OUTPATIENT
Start: 2024-09-30 | End: 2024-09-30 | Stop reason: ALTCHOICE

## 2024-09-30 RX ORDER — FENTANYL CITRATE 50 UG/ML
25 INJECTION, SOLUTION INTRAMUSCULAR; INTRAVENOUS AS NEEDED
Status: DISCONTINUED | OUTPATIENT
Start: 2024-09-30 | End: 2024-09-30 | Stop reason: HOSPADM

## 2024-09-30 RX ORDER — EPHEDRINE SULFATE/0.9% NACL/PF 25 MG/5 ML
SYRINGE (ML) INTRAVENOUS
Status: DISCONTINUED | OUTPATIENT
Start: 2024-09-30 | End: 2024-09-30 | Stop reason: SDUPTHER

## 2024-09-30 RX ORDER — ONDANSETRON 2 MG/ML
INJECTION INTRAMUSCULAR; INTRAVENOUS
Status: DISCONTINUED | OUTPATIENT
Start: 2024-09-30 | End: 2024-09-30 | Stop reason: SDUPTHER

## 2024-09-30 RX ORDER — DEXTROSE MONOHYDRATE 100 MG/ML
INJECTION, SOLUTION INTRAVENOUS CONTINUOUS PRN
Status: DISCONTINUED | OUTPATIENT
Start: 2024-09-30 | End: 2024-09-30 | Stop reason: HOSPADM

## 2024-09-30 RX ORDER — NALOXONE HYDROCHLORIDE 0.4 MG/ML
INJECTION, SOLUTION INTRAMUSCULAR; INTRAVENOUS; SUBCUTANEOUS PRN
Status: DISCONTINUED | OUTPATIENT
Start: 2024-09-30 | End: 2024-09-30 | Stop reason: HOSPADM

## 2024-09-30 RX ORDER — LIDOCAINE HYDROCHLORIDE 20 MG/ML
INJECTION, SOLUTION EPIDURAL; INFILTRATION; INTRACAUDAL; PERINEURAL
Status: DISCONTINUED | OUTPATIENT
Start: 2024-09-30 | End: 2024-09-30 | Stop reason: SDUPTHER

## 2024-09-30 RX ORDER — SODIUM CHLORIDE, SODIUM LACTATE, POTASSIUM CHLORIDE, CALCIUM CHLORIDE 600; 310; 30; 20 MG/100ML; MG/100ML; MG/100ML; MG/100ML
INJECTION, SOLUTION INTRAVENOUS CONTINUOUS
Status: DISCONTINUED | OUTPATIENT
Start: 2024-09-30 | End: 2024-09-30 | Stop reason: HOSPADM

## 2024-09-30 RX ORDER — ROCURONIUM BROMIDE 10 MG/ML
INJECTION, SOLUTION INTRAVENOUS
Status: DISCONTINUED | OUTPATIENT
Start: 2024-09-30 | End: 2024-09-30 | Stop reason: SDUPTHER

## 2024-09-30 RX ADMIN — EPHEDRINE SULFATE 5 MG: 5 INJECTION INTRAVENOUS at 11:18

## 2024-09-30 RX ADMIN — EPHEDRINE SULFATE 5 MG: 5 INJECTION INTRAVENOUS at 11:12

## 2024-09-30 RX ADMIN — SODIUM CHLORIDE, SODIUM LACTATE, POTASSIUM CHLORIDE, AND CALCIUM CHLORIDE: 600; 310; 30; 20 INJECTION, SOLUTION INTRAVENOUS at 10:28

## 2024-09-30 RX ADMIN — CEFAZOLIN 2 G: 1 INJECTION, POWDER, FOR SOLUTION INTRAMUSCULAR; INTRAVENOUS at 10:40

## 2024-09-30 RX ADMIN — EPHEDRINE SULFATE 5 MG: 5 INJECTION INTRAVENOUS at 10:59

## 2024-09-30 RX ADMIN — ROCURONIUM BROMIDE 5 MG: 10 INJECTION, SOLUTION INTRAVENOUS at 10:37

## 2024-09-30 RX ADMIN — Medication 100 MG: at 10:37

## 2024-09-30 RX ADMIN — FENTANYL CITRATE 25 MCG: 50 INJECTION INTRAMUSCULAR; INTRAVENOUS at 10:37

## 2024-09-30 RX ADMIN — LIDOCAINE HYDROCHLORIDE 60 MG: 20 INJECTION, SOLUTION EPIDURAL; INFILTRATION; INTRACAUDAL; PERINEURAL at 10:37

## 2024-09-30 RX ADMIN — FENTANYL CITRATE 25 MCG: 50 INJECTION INTRAMUSCULAR; INTRAVENOUS at 11:12

## 2024-09-30 RX ADMIN — ONDANSETRON 4 MG: 2 INJECTION INTRAMUSCULAR; INTRAVENOUS at 11:30

## 2024-09-30 RX ADMIN — EPHEDRINE SULFATE 5 MG: 5 INJECTION INTRAVENOUS at 11:21

## 2024-09-30 RX ADMIN — EPHEDRINE SULFATE 5 MG: 5 INJECTION INTRAVENOUS at 11:04

## 2024-09-30 RX ADMIN — FAMOTIDINE 20 MG: 20 TABLET ORAL at 07:43

## 2024-09-30 RX ADMIN — PROPOFOL 30 MG: 10 INJECTION, EMULSION INTRAVENOUS at 10:59

## 2024-09-30 RX ADMIN — EPHEDRINE SULFATE 10 MG: 5 INJECTION INTRAVENOUS at 11:08

## 2024-09-30 RX ADMIN — PROPOFOL 25 MG: 10 INJECTION, EMULSION INTRAVENOUS at 11:12

## 2024-09-30 RX ADMIN — SODIUM CHLORIDE, SODIUM LACTATE, POTASSIUM CHLORIDE, AND CALCIUM CHLORIDE: 600; 310; 30; 20 INJECTION, SOLUTION INTRAVENOUS at 07:42

## 2024-09-30 RX ADMIN — DEXAMETHASONE SODIUM PHOSPHATE 4 MG: 4 INJECTION INTRA-ARTICULAR; INTRALESIONAL; INTRAMUSCULAR; INTRAVENOUS; SOFT TISSUE at 10:40

## 2024-09-30 RX ADMIN — PROPOFOL 100 MG: 10 INJECTION, EMULSION INTRAVENOUS at 10:37

## 2024-09-30 ASSESSMENT — PAIN - FUNCTIONAL ASSESSMENT
PAIN_FUNCTIONAL_ASSESSMENT: ACTIVITIES ARE NOT PREVENTED
PAIN_FUNCTIONAL_ASSESSMENT: ACTIVITIES ARE NOT PREVENTED

## 2024-09-30 ASSESSMENT — PAIN SCALES - GENERAL
PAINLEVEL_OUTOF10: 0

## 2024-09-30 ASSESSMENT — COPD QUESTIONNAIRES: CAT_SEVERITY: MODERATE

## 2024-09-30 NOTE — ANESTHESIA POSTPROCEDURE EVALUATION
Department of Anesthesiology  Postprocedure Note    Patient: Loco Hutson  MRN: [id number]  YOB: 1940  Date of evaluation: 9/30/2024    Procedure Summary       Date: 09/30/24 Room / Location: [facility] MAIN 07 / [facility] MAIN OR    Anesthesia Start: 1027 Anesthesia Stop: 1202    Procedure: MOHS RECONSTRUCTION OF FOREHEAD, ADJACENT TISSUE TRANSFER (Face) Diagnosis:       Basal cell carcinoma of skin of other parts of face      (Basal cell carcinoma of skin of other parts of face [C44.319])    Surgeons: Matteo Loyd MD Responsible Provider: Roe Tan MD    Anesthesia Type: General ASA Status: 3            Anesthesia Type: General    Jose Francisco Phase I: Jose Francisco Score: 10    Jose Francisco Phase II: Jose Francisco Score: 10    Anesthesia Post Evaluation    Patient location during evaluation: bedside  Patient participation: complete - patient participated  Level of consciousness: responsive to verbal stimuli  Airway patency: patent  Nausea & Vomiting: no nausea  Respiratory status: acceptable  Hydration status: euvolemic    No notable events documented.

## 2024-09-30 NOTE — DISCHARGE INSTRUCTIONS
serious risk to your health.    Obesity, smoking, and sedentary lifestyle greatly increases your risk for illness    A healthy diet, regular physical exercise & weight monitoring are important for maintaining a healthy lifestyle    You may be retaining fluid if you have a history of heart failure or if you experience any of the following symptoms:  Weight gain of 3 pounds or more overnight or 5 pounds in a week, increased swelling in our hands or feet or shortness of breath while lying flat in bed.  Please call your doctor as soon as you notice any of these symptoms; do not wait until your next office visit.        The discharge information has been reviewed with the patient.  The patient verbalized understanding.  Discharge medications reviewed with the patient and appropriate educational materials and side effects teaching were provided.  ___________________________________________________________________________________________________________________________________

## 2024-09-30 NOTE — ANESTHESIA PRE PROCEDURE
s/p left CEA  with recurrent stenosis treated with stenting May 2011   • Cataract    • COPD (chronic obstructive pulmonary disease) (HCC)     emphysema   • DJD (degenerative joint disease)    • Esophageal cancer (HCC)     Polyps removed   • GERD (gastroesophageal reflux disease)    • History of tobacco use    • HTN (hypertension)    • Hyperlipidemia    • Other ill-defined conditions(799.89)     stent in carotid artery-left   • PAD (peripheral artery disease) (HCC)     followed by Dr Leigh, apparently abnormal Doppler testing of LLE   • Prolonged emergence from general anesthesia    • Stroke (HCC)     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-       Past Surgical History:        Procedure Laterality Date   • CAROTID ENDARTERECTOMY  2006    left   • HEENT      polyps removed from vocal cords   • OTHER SURGICAL HISTORY      carotid stent placed   • OTHER SURGICAL HISTORY      balloon and stent placed in leg   • REFRACTIVE SURGERY         Social History:    Social History     Tobacco Use   • Smoking status: Former     Current packs/day: 0.00     Types: Cigarettes     Quit date: 2011     Years since quittin.4   • Smokeless tobacco: Never   Substance Use Topics   • Alcohol use: Yes     Comment: occ                                Counseling given: Not Answered      Vital Signs (Current):   Vitals:    24 1002 24 0700   BP:  129/68   Pulse:  53   Resp:  18   Temp:  97.8 °F (36.6 °C)   TempSrc:  Oral   SpO2:  100%   Weight: 65.8 kg (145 lb) 61.7 kg (136 lb)   Height: 1.676 m (5' 6\") 1.676 m (5' 5.98\")                                              BP Readings from Last 3 Encounters:   24 129/68   21 129/60   10/14/21 114/64       NPO Status: Time of last liquid consumption:                         Time of last solid consumption:                         Date of last liquid consumption: 24                        Date of last solid food consumption: 24    BMI:   Wt Readings from Last 3

## 2024-09-30 NOTE — OP NOTE
Plastic and Reconstructive Surgery  Operation/Procedure Note      Patient Name: Loco Hutson  Patient : 1940  Patient Sex: male  Patient Number: [id number]    Date of Operation: 2024  Place of Operation: [facility]    Pre-Operative Diagnosis: central forehead wound  Post-Operative Diagnosis: same  Procedure(s): surgical preparation forehead, 10 cm^2; adjacent tissue transfer forehead, 16 cm x 22 cm    Surgeon: Matteo Loyd M.D.  Co-Surgeon(s)/Assistant(s): N/A  Anesthesiologist(s): Roe Tan MD  Anesthesia: GETA    Findings: large forehead wound reconstructed with ATT    Indications/Risks/Consent: The patient is a 83yM. The options for management as well as the risks/benefits/alternatives/potential complications of each were discussed in detail with the patient, including but not limited to pain, infection, bleeding, dehiscence, need for additional treatment including surgery, recurrence, incomplete correction, damage to surrounding structures including bones/vessels/nerves/organs/tendons/muscles, poor cosmesis and poor scarring. After this extensive and detailed discussion, the patient expressed understanding of and agreement with the plan for the proposed procedure(s). Written consent was obtained.    Description of the Procedure: The patient was taken to the treatment area and placed on the procedure table in a supine position. Sequential compression devices were placed on bilateral lower extremities and all pressure points were appropriately padded. The surgical site was prepped and draped in the standard sterile fashion. A surgical time-out was performed with all team members present and attentive. Ancef was administered for surgical prophylaxis.    Surgical preparation proceeded via excision of open wound and freshening of the wound edges and base. Local anesthetic was infiltrated. A near-total forehead and scalp galeal dissection was performed to allow for adequate

## 2024-09-30 NOTE — H&P
nerves/vessels/bones/tendons/muscles/skin, paralysis, malunion/nonunion, need for additional treatment including surgery, recurrence, incomplete correction, poor cosmesis and poor scarring. Potential implant problems including exposure, infection, extrusion, rupture, capsular contracture, ALCL, rotation and malposition were also discussed if applicable. The patient had an opportunity to ask all questions and to have all concerns addressed. Written consent was obtained and confirmed. The patient was marked.    To the OR today for forehead reconstruction.      Matteo Loyd MD, FACS  9/30/2024  9:43 AM

## 2024-10-18 RX ORDER — DULOXETIN HYDROCHLORIDE 30 MG/1
CAPSULE, DELAYED RELEASE ORAL
Refills: 0 | OUTPATIENT
Start: 2024-10-18

## 2025-06-09 ENCOUNTER — TELEPHONE (OUTPATIENT)
Age: 85
End: 2025-06-09

## 2025-06-30 ENCOUNTER — OFFICE VISIT (OUTPATIENT)
Age: 85
End: 2025-06-30
Payer: MEDICARE

## 2025-06-30 VITALS — WEIGHT: 130.8 LBS | BODY MASS INDEX: 21.79 KG/M2 | HEIGHT: 65 IN

## 2025-06-30 DIAGNOSIS — Z86.73 HISTORY OF CVA (CEREBROVASCULAR ACCIDENT): ICD-10-CM

## 2025-06-30 DIAGNOSIS — J44.9 CHRONIC OBSTRUCTIVE PULMONARY DISEASE, UNSPECIFIED COPD TYPE (HCC): ICD-10-CM

## 2025-06-30 DIAGNOSIS — I10 HYPERTENSION, UNSPECIFIED TYPE: ICD-10-CM

## 2025-06-30 DIAGNOSIS — I77.9 CAROTID ARTERY DISEASE, UNSPECIFIED LATERALITY, UNSPECIFIED TYPE: ICD-10-CM

## 2025-06-30 DIAGNOSIS — M25.551 BILATERAL HIP PAIN: Primary | ICD-10-CM

## 2025-06-30 DIAGNOSIS — M25.552 BILATERAL HIP PAIN: Primary | ICD-10-CM

## 2025-06-30 DIAGNOSIS — Z87.891 HISTORY OF TOBACCO USE: ICD-10-CM

## 2025-06-30 PROCEDURE — 1126F AMNT PAIN NOTED NONE PRSNT: CPT | Performed by: ORTHOPAEDIC SURGERY

## 2025-06-30 PROCEDURE — 1123F ACP DISCUSS/DSCN MKR DOCD: CPT | Performed by: ORTHOPAEDIC SURGERY

## 2025-06-30 PROCEDURE — 1159F MED LIST DOCD IN RCRD: CPT | Performed by: ORTHOPAEDIC SURGERY

## 2025-06-30 PROCEDURE — G8420 CALC BMI NORM PARAMETERS: HCPCS | Performed by: ORTHOPAEDIC SURGERY

## 2025-06-30 PROCEDURE — 3023F SPIROM DOC REV: CPT | Performed by: ORTHOPAEDIC SURGERY

## 2025-06-30 PROCEDURE — 99204 OFFICE O/P NEW MOD 45 MIN: CPT | Performed by: ORTHOPAEDIC SURGERY

## 2025-06-30 PROCEDURE — 1036F TOBACCO NON-USER: CPT | Performed by: ORTHOPAEDIC SURGERY

## 2025-06-30 PROCEDURE — 1160F RVW MEDS BY RX/DR IN RCRD: CPT | Performed by: ORTHOPAEDIC SURGERY

## 2025-06-30 PROCEDURE — 73523 X-RAY EXAM HIPS BI 5/> VIEWS: CPT | Performed by: ORTHOPAEDIC SURGERY

## 2025-06-30 PROCEDURE — G8427 DOCREV CUR MEDS BY ELIG CLIN: HCPCS | Performed by: ORTHOPAEDIC SURGERY

## 2025-06-30 RX ORDER — MELOXICAM 15 MG/1
15 TABLET ORAL DAILY
Qty: 30 TABLET | Refills: 2 | Status: SHIPPED | OUTPATIENT
Start: 2025-06-30 | End: 2025-09-28

## 2025-06-30 RX ORDER — LIDOCAINE 50 MG/G
1 PATCH TOPICAL DAILY
Qty: 30 PATCH | Refills: 0 | Status: SHIPPED | OUTPATIENT
Start: 2025-06-30 | End: 2025-07-30

## 2025-06-30 RX ORDER — ACETAMINOPHEN 500 MG
1000 TABLET ORAL EVERY 8 HOURS PRN
Qty: 180 TABLET | Refills: 0 | Status: SHIPPED | OUTPATIENT
Start: 2025-06-30 | End: 2025-07-30

## 2025-06-30 NOTE — PROGRESS NOTES
Patient: Loco Hutson                MRN: [id number]       SSN: [id number]  YOB: 1940        AGE: 84 y.o.        SEX: male  BMI: Body mass index is 21.77 kg/m².    PCP: Young Batista MD  06/30/25    Chief Complaint: New Patient (Bilateral hip pain )      1. Bilateral hip pain  -     AMB POC XRAY, HIPS, BILAT, MIN 5 VIEWS  -     BS - [facility] - [place], [place]  -     acetaminophen (TYLENOL) 500 MG tablet; Take 2 tablets by mouth every 8 hours as needed for Pain, Disp-180 tablet, R-0Normal  -     meloxicam (MOBIC) 15 MG tablet; Take 1 tablet by mouth daily, Disp-30 tablet, R-2Normal  -     diclofenac sodium (VOLTAREN) 1 % GEL; Apply 4 g topically 4 times daily, Topical, 4 TIMES DAILY Starting Mon 6/30/2025, Until Sun 9/28/2025, For 90 days, Disp-350 g, R-5, Normal  -     lidocaine (LIDODERM) 5 %; Place 1 patch onto the skin daily 12 hours on, 12 hours off., Disp-30 patch, R-0Normal  2. History of tobacco use  3. Carotid artery disease, unspecified laterality, unspecified type  4. Hypertension, unspecified type  5. Chronic obstructive pulmonary disease, unspecified COPD type (HCC)  6. History of CVA (cerebrovascular accident)      HPI:  Loco Hutson is a 84 y.o. male New  patient with chief complaint of   Chief Complaint   Patient presents with    New Patient     Bilateral hip pain      Bilateral buttock and hip region pain that is worse on the right than the left side.  Patient originally started having pain when he had a fall in the right hip around [place] time of 2023.  He fell off of the bed in a hotel room in [place] and fell onto his right side.  He had x-rays at that time which were negative.  When he got back from vacation to this area he again went and got x-rays which were again negative.  He continues to have pain which is in the deep buttocks area.  He denies any radiation distally into the lower extremity nor into the back.  He 
(2) assistive person

## (undated) DEVICE — Device

## (undated) DEVICE — CORD ES L12FT BPLR FRCP

## (undated) DEVICE — TRAY PREP DRY W/ PREM GLV 2 APPL 6 SPNG 2 UNDPD 1 OVERWRAP

## (undated) DEVICE — SHEET, DRAPE, SPLIT, STERILE: Brand: MEDLINE

## (undated) DEVICE — SUTURE PROL SZ 6-0 L18IN NONABSORBABLE BLU P-3 L13MM 3/8 8695G

## (undated) DEVICE — BLADE,STAINLESS-STEEL,15,STRL,DISPOSABLE: Brand: MEDLINE

## (undated) DEVICE — LINER,SEMI-RIGID,3000CC,50EA/CS: Brand: MEDLINE

## (undated) DEVICE — SKIN PREP TRAY 4 COMPARTM TRAY: Brand: MEDLINE INDUSTRIES, INC.

## (undated) DEVICE — DRAPE,REIN 53X77,STERILE: Brand: MEDLINE

## (undated) DEVICE — SUTURE ABSORBABLE BRAIDED 4-0 P-3 18 IN UD VICRYL J494G

## (undated) DEVICE — GLOVE SURG SZ 7 CRM LTX FREE POLYISOPRENE POLYMER BEAD ANTI

## (undated) DEVICE — GLOVE SURG SZ 65 THK91MIL LTX FREE SYN POLYISOPRENE